# Patient Record
Sex: FEMALE | Race: WHITE | NOT HISPANIC OR LATINO | Employment: FULL TIME | URBAN - NONMETROPOLITAN AREA
[De-identification: names, ages, dates, MRNs, and addresses within clinical notes are randomized per-mention and may not be internally consistent; named-entity substitution may affect disease eponyms.]

---

## 2022-01-13 ENCOUNTER — OFFICE VISIT (OUTPATIENT)
Dept: FAMILY MEDICINE | Facility: CLINIC | Age: 47
End: 2022-01-13

## 2022-01-13 VITALS
OXYGEN SATURATION: 100 % | HEART RATE: 91 BPM | BODY MASS INDEX: 30.53 KG/M2 | TEMPERATURE: 98 F | HEIGHT: 66 IN | WEIGHT: 190 LBS

## 2022-01-13 DIAGNOSIS — R05.9 COUGH: ICD-10-CM

## 2022-01-13 DIAGNOSIS — U07.1 COVID-19: ICD-10-CM

## 2022-01-13 DIAGNOSIS — R09.89 CHEST CONGESTION: Primary | ICD-10-CM

## 2022-01-13 DIAGNOSIS — R51.9 NONINTRACTABLE HEADACHE, UNSPECIFIED CHRONICITY PATTERN, UNSPECIFIED HEADACHE TYPE: ICD-10-CM

## 2022-01-13 LAB
CTP QC/QA: YES
CTP QC/QA: YES
FLUAV AG NPH QL: NEGATIVE
FLUBV AG NPH QL: NEGATIVE
SARS-COV-2 AG RESP QL IA.RAPID: POSITIVE

## 2022-01-13 PROCEDURE — 87804 POCT INFLUENZA A/B: ICD-10-PCS | Mod: QW,,, | Performed by: NURSE PRACTITIONER

## 2022-01-13 PROCEDURE — 99203 PR OFFICE/OUTPT VISIT, NEW, LEVL III, 30-44 MIN: ICD-10-PCS | Mod: ,,, | Performed by: NURSE PRACTITIONER

## 2022-01-13 PROCEDURE — 99203 OFFICE O/P NEW LOW 30 MIN: CPT | Mod: ,,, | Performed by: NURSE PRACTITIONER

## 2022-01-13 PROCEDURE — 87804 INFLUENZA ASSAY W/OPTIC: CPT | Mod: QW,,, | Performed by: NURSE PRACTITIONER

## 2022-01-13 PROCEDURE — 87426 SARS CORONAVIRUS 2 ANTIGEN POCT: ICD-10-PCS | Mod: QW,,, | Performed by: NURSE PRACTITIONER

## 2022-01-13 PROCEDURE — 87426 SARSCOV CORONAVIRUS AG IA: CPT | Mod: QW,,, | Performed by: NURSE PRACTITIONER

## 2022-01-13 RX ORDER — NORGESTREL AND ETHINYL ESTRADIOL 0.3-0.03MG
1 KIT ORAL DAILY
COMMUNITY
Start: 2022-01-09

## 2022-01-13 RX ORDER — AZITHROMYCIN 250 MG/1
TABLET, FILM COATED ORAL
Qty: 6 TABLET | Refills: 0 | Status: SHIPPED | OUTPATIENT
Start: 2022-01-13 | End: 2022-01-18

## 2022-01-13 RX ORDER — BENZONATATE 100 MG/1
100 CAPSULE ORAL 3 TIMES DAILY PRN
Qty: 30 CAPSULE | Refills: 0 | Status: SHIPPED | OUTPATIENT
Start: 2022-01-13 | End: 2022-01-23

## 2022-01-13 NOTE — PROGRESS NOTES
YVETTE Chacon   [facility]      PATIENT NAME: Stephanie Mcarthur  : 1975  DATE: 22  MRN: [id number]      Patient PCP Information     Provider PCP Type    Primary Doctor No General          Reason for Visit / Chief Complaint: Cough, Headache, and Rhinitis         History of Present Illness / Problem Focused Workflow     Stephanie Mcarthur presents to the clinic with Cough, Headache, and Rhinitis     HPI   Patient reports body aches, chills, cough and congestion which began . Chills and body aches have resolved however patient reporting continued cough, congestion. Patient has hx of covid in . Received 1 dose of Moderna vaccine.     Patients LMP approx 9 weeks ago. Typically irregular. On oral birth control.  with prior vasectomy.    Review of Systems     Review of Systems   Constitutional: Negative.    HENT: Positive for congestion, rhinorrhea and sinus pressure.    Eyes: Negative.    Respiratory: Positive for cough.    Cardiovascular: Negative.    Gastrointestinal: Negative.    Endocrine: Negative.    Genitourinary: Negative.    Musculoskeletal: Negative.    Skin: Negative.    Neurological: Positive for headaches.   Hematological: Negative.    Psychiatric/Behavioral: Negative.        Medical / Social / Family History   History reviewed. No pertinent past medical history.    Past Surgical History:   Procedure Laterality Date     SECTION      x4       Social History  Ms.  reports that she has never smoked. She has never used smokeless tobacco. She reports previous drug use. She reports that she does not drink alcohol.    Family History  MsKeely's family history is not on file.    Medications and Allergies     Medications  No outpatient medications have been marked as taking for the 22 encounter (Office Visit) with YVETTE Chacon.       Allergies  Review of patient's allergies indicates:  No Known Allergies    Physical Examination   There were no vitals filed for  this visit.    Physical Exam  Vitals reviewed.   HENT:      Head: Normocephalic.      Right Ear: External ear normal.      Left Ear: External ear normal.      Nose: Nose normal.      Mouth/Throat:      Mouth: Mucous membranes are moist.   Eyes:      Extraocular Movements: Extraocular movements intact.   Cardiovascular:      Rate and Rhythm: Normal rate.      Pulses: Normal pulses.   Pulmonary:      Effort: Pulmonary effort is normal.   Musculoskeletal:         General: Normal range of motion.      Cervical back: Normal range of motion.   Skin:     General: Skin is warm and dry.      Capillary Refill: Capillary refill takes less than 2 seconds.   Neurological:      General: No focal deficit present.      Mental Status: She is oriented to person, place, and time.   Psychiatric:         Mood and Affect: Mood normal.         Behavior: Behavior normal.         Thought Content: Thought content normal.         Judgment: Judgment normal.           No visits with results within 14 Day(s) from this visit.   Latest known visit with results is:   No results found for any previous visit.             Assessment and Plan (including Health Maintenance)      Problem List  Smart Sets  Document Outside HM   :    Plan:   There are no diagnoses linked to this encounter.   There are no Patient Instructions on file for this visit.       Health Maintenance Due   Topic Date Due    Hepatitis C Screening  Never done    Lipid Panel  Never done    COVID-19 Vaccine (1) Never done    HIV Screening  Never done    TETANUS VACCINE  Never done    Mammogram  Never done    Cervical Cancer Screening  Never done    Colorectal Cancer Screening  Never done    Influenza Vaccine (1) Never done         There is no immunization history on file for this patient.     Problem List Items Addressed This Visit    None         The patient has no Health Maintenance topics of status Not Due    No future appointments.         Signature:  Marifer Rios  YVETTE  [facility]     Date of encounter: 1/13/22

## 2022-01-13 NOTE — LETTER
January 13, 2022    Stephanie Mcarthur  [address] [place] [address]             [facility]  Family Medicine  [address] [place] [address]  Phone: [phone]  Fax: [phone]   January 13, 2022     Patient: Stephanie Mcarthur   YOB: 1975   Date of Visit: 1/13/2022       To Whom it May Concern:    Stephanie Mcarthur was seen in my clinic on 1/13/2022. She may return to work on 1/19/22.    Please excuse her from any classes or work missed.    If you have any questions or concerns, please don't hesitate to call.    Sincerely,         YVETTE Chacon

## 2022-01-13 NOTE — PATIENT INSTRUCTIONS
10 THINGS YOU CAN DO TO MANAGE YOUR COVID-19 SYMPTOMS AT HOME  COVID-19    If you have possible or confirmed COVID-19   1. Stay home except to get medical care.   2. Monitor your symptoms carefully. If your symptoms get worse, call your healthcare provider immediately.   3. Get rest and stay hydrated.   4. If you have a medical appointment, call the healthcare provider ahead of time and tell them that you have or may have COVID-19.   5. For medical emergencies, call 911 and notify the dispatch personnel that you have or may have COVID-19.   6. Cover your cough and sneezes with a tissue or use the inside of your elbow.   7. Wash your hands often with soap and water for at least 20 seconds or clean your hands with an alcohol-based hand  that contains at least 60% alcohol.   8. As much as possible, stay in a specific room and away from other people in your home. Also, you should use a separate bathroom, if available. If you need to be around other people in or outside of the home, wear a mask.   9. Avoid sharing personal items with other people in your household, like dishes, towels, and bedding.   10. Clean all surfaces that are touched often, like counters, tabletops, and doorknobs. Use household cleaning sprays or wipes according to the label instructions.   cdc.gov/coronavirus      Pepcid/Famotidine 20 mg twice daily  Vitamin C 1000 mg daily  Zinc 15 mg daily  Melatonin 0.3 mg daily  Adequate hydration  Tylenol prn for fever/body aches   Over the counter mucinx as needed for congestion as directed

## 2022-01-14 NOTE — PROGRESS NOTES
YVETTE Chacon   [facility]      PATIENT NAME: Stephanie Mcarthur  : 1975  DATE: 22  MRN: [id number]      Patient PCP Information     Provider PCP Type    Primary Doctor No General          Reason for Visit / Chief Complaint: Cough, Headache, and Rhinitis         History of Present Illness / Problem Focused Workflow     Stephanie Mcarthur presents to the clinic with Cough, Headache, and Rhinitis     HPI   Patient reports body aches, chills, cough and congestion which began . Chills and body aches have resolved however patient reporting continued cough, congestion. Patient has hx of covid in . Received 1 dose of Moderna vaccine.     Patients LMP approx 9 weeks ago. Typically irregular. On oral birth control.  with prior vasectomy.    Review of Systems     Review of Systems   Constitutional: Negative.    HENT: Positive for congestion, rhinorrhea and sinus pressure.    Eyes: Negative.    Respiratory: Positive for cough.    Cardiovascular: Negative.    Gastrointestinal: Negative.    Endocrine: Negative.    Genitourinary: Negative.    Musculoskeletal: Negative.    Skin: Negative.    Neurological: Positive for headaches.   Hematological: Negative.    Psychiatric/Behavioral: Negative.        Medical / Social / Family History   History reviewed. No pertinent past medical history.    Past Surgical History:   Procedure Laterality Date     SECTION      x4       Social History  Ms.  reports that she has never smoked. She has never used smokeless tobacco. She reports previous drug use. She reports that she does not drink alcohol.    Family History  MsKeely's family history is not on file.    Medications and Allergies     Medications  No outpatient medications have been marked as taking for the 22 encounter (Office Visit) with YVETTE Chacon.       Allergies  Review of patient's allergies indicates:  No Known Allergies    Physical Examination     Vitals:    22 0959  "  Pulse: 91   Temp: 98.4 °F (36.9 °C)   SpO2: 100%   Weight: 86.2 kg (190 lb)   Height: 5' 6" (1.676 m)       Physical Exam  Vitals reviewed.   HENT:      Head: Normocephalic.      Right Ear: External ear normal.      Left Ear: External ear normal.      Nose: Nose normal.      Mouth/Throat:      Mouth: Mucous membranes are moist.   Eyes:      Extraocular Movements: Extraocular movements intact.   Cardiovascular:      Rate and Rhythm: Normal rate.      Pulses: Normal pulses.   Pulmonary:      Effort: Pulmonary effort is normal.   Musculoskeletal:         General: Normal range of motion.      Cervical back: Normal range of motion.   Skin:     General: Skin is warm and dry.      Capillary Refill: Capillary refill takes less than 2 seconds.   Neurological:      General: No focal deficit present.      Mental Status: She is oriented to person, place, and time.   Psychiatric:         Mood and Affect: Mood normal.         Behavior: Behavior normal.         Thought Content: Thought content normal.         Judgment: Judgment normal.           No visits with results within 14 Day(s) from this visit.   Latest known visit with results is:   No results found for any previous visit.             Assessment and Plan (including Health Maintenance)         Plan:   Covid-19  Chest congestion  -     SARS Coronavirus 2 Antigen, POCT positive  -     POCT Influenza A/B negative     Cough    Nonintractable headache, unspecified chronicity pattern, unspecified headache type    Other orders  -     benzonatate (TESSALON) 100 MG capsule; Take 1 capsule (100 mg total) by mouth 3 (three) times daily as needed for Cough.  Dispense: 30 capsule; Refill: 0  -     azithromycin (Z-JERRY) 250 MG tablet; Take 2 tablets by mouth on day 1; Take 1 tablet by mouth on days 2-5  Dispense: 6 tablet; Refill: 0            10 THINGS YOU CAN DO TO MANAGE YOUR COVID-19 SYMPTOMS AT HOME  COVID-19    If you have possible or confirmed COVID-19   1. Stay home except to " get medical care.   2. Monitor your symptoms carefully. If your symptoms get worse, call your healthcare provider immediately.   3. Get rest and stay hydrated.   4. If you have a medical appointment, call the healthcare provider ahead of time and tell them that you have or may have COVID-19.   5. For medical emergencies, call 911 and notify the dispatch personnel that you have or may have COVID-19.   6. Cover your cough and sneezes with a tissue or use the inside of your elbow.   7. Wash your hands often with soap and water for at least 20 seconds or clean your hands with an alcohol-based hand  that contains at least 60% alcohol.   8. As much as possible, stay in a specific room and away from other people in your home. Also, you should use a separate bathroom, if available. If you need to be around other people in or outside of the home, wear a mask.   9. Avoid sharing personal items with other people in your household, like dishes, towels, and bedding.   10. Clean all surfaces that are touched often, like counters, tabletops, and doorknobs. Use household cleaning sprays or wipes according to the label instructions.   cdc.gov/coronavirus  Pepcid/Famotidine 20 mg twice daily  Vitamin C 1000 mg daily  Zinc 15 mg daily  Melatonin 0.3 mg daily  Adequate hydration  Tylenol prn for fever/body aches   Over the counter mucinx as needed for congestion as directed

## 2022-03-08 ENCOUNTER — OFFICE VISIT (OUTPATIENT)
Dept: FAMILY MEDICINE | Facility: CLINIC | Age: 47
End: 2022-03-08

## 2022-03-08 VITALS
DIASTOLIC BLOOD PRESSURE: 82 MMHG | HEART RATE: 86 BPM | RESPIRATION RATE: 18 BRPM | OXYGEN SATURATION: 100 % | BODY MASS INDEX: 31.06 KG/M2 | HEIGHT: 66 IN | TEMPERATURE: 98 F | WEIGHT: 193.25 LBS | SYSTOLIC BLOOD PRESSURE: 131 MMHG

## 2022-03-08 DIAGNOSIS — B00.9 HERPES SIMPLEX TYPE 1 INFECTION: Primary | ICD-10-CM

## 2022-03-08 PROCEDURE — 99213 PR OFFICE/OUTPT VISIT, EST, LEVL III, 20-29 MIN: ICD-10-PCS | Mod: ,,, | Performed by: NURSE PRACTITIONER

## 2022-03-08 PROCEDURE — 99213 OFFICE O/P EST LOW 20 MIN: CPT | Mod: ,,, | Performed by: NURSE PRACTITIONER

## 2022-03-08 RX ORDER — ACYCLOVIR 400 MG/1
400 TABLET ORAL 3 TIMES DAILY
Qty: 30 TABLET | Refills: 0 | Status: SHIPPED | OUTPATIENT
Start: 2022-03-08 | End: 2022-03-08 | Stop reason: CLARIF

## 2022-03-08 RX ORDER — ACYCLOVIR 400 MG/1
400 TABLET ORAL 3 TIMES DAILY
Qty: 30 TABLET | Refills: 0 | Status: SHIPPED | OUTPATIENT
Start: 2022-03-08 | End: 2022-03-18

## 2022-03-08 NOTE — PROGRESS NOTES
Marifer Rios, YVETTE   [facility]      PATIENT NAME: Stephanie Mcarthur  : 1975  DATE: 3/8/22  MRN: [id number]      Patient PCP Information     Provider PCP Type    Primary Doctor No General          Reason for Visit / Chief Complaint: Sinus Problem (Room 5)         History of Present Illness / Problem Focused Workflow     Stephanie Mcarthur presents to the clinic with Sinus Problem (Room 5)     HPI   Left facial pain/abnormal sensation to left side of face. Patient with breakout to left lower lip. Left side of face painful to touch. Started Hung 3/6/2022.  States altered sensation when lying on left side of face.   Runs to top of forehead and left cheek.   No fever. Hx of bells palsy 19 years ago. No residual facial weakness. Does have hx of chicken pox.   No facial breakout, mouth sore only. OP benign.         Review of Systems     Review of Systems   Constitutional: Negative for activity change and unexpected weight change.   HENT: Positive for mouth sores and rhinorrhea. Negative for hearing loss and trouble swallowing.    Eyes: Negative for discharge and visual disturbance.   Respiratory: Negative for chest tightness and wheezing.    Cardiovascular: Negative for chest pain and palpitations.   Gastrointestinal: Negative for blood in stool, constipation, diarrhea and vomiting.   Endocrine: Negative for polydipsia and polyuria.   Genitourinary: Negative for difficulty urinating, dysuria, hematuria and menstrual problem.   Musculoskeletal: Negative for arthralgias, joint swelling and neck pain.   Neurological: Positive for headaches. Negative for weakness.   Psychiatric/Behavioral: Negative for confusion and dysphoric mood.       Medical / Social / Family History     Past Medical History:   Diagnosis Date    Bell's palsy     Hx of bell's palsy. pt states during pregnancy        Past Surgical History:   Procedure Laterality Date     SECTION      x4    EYE SURGERY      KNEE SURGERY      left  "      Social History  Ms.  reports that she has never smoked. She has never used smokeless tobacco. She reports previous drug use. She reports that she does not drink alcohol.    Family History  Ms.'s family history includes Heart disease in her father; Hypertension in her mother.    Medications and Allergies     Medications  Outpatient Medications Marked as Taking for the 3/8/22 encounter (Office Visit) with YVETTE Chacon   Medication Sig Dispense Refill    CRYSELLE, 28, 0.3-30 mg-mcg per tablet Take 1 tablet by mouth once daily.         Allergies  Review of patient's allergies indicates:  No Known Allergies    Physical Examination     Vitals:    03/08/22 1043   BP: 131/82   BP Location: Right arm   Patient Position: Sitting   Pulse: 86   Resp: 18   Temp: 97.9 °F (36.6 °C)   SpO2: 100%   Weight: 87.7 kg (193 lb 4 oz)   Height: 5' 6" (1.676 m)       Physical Exam  Vitals and nursing note reviewed.   Constitutional:       Appearance: Normal appearance. She is normal weight.   HENT:      Head: Normocephalic.      Nose: Nose normal.      Mouth/Throat:      Mouth: Mucous membranes are moist. Oral lesions present.     Eyes:      Extraocular Movements: Extraocular movements intact.      Conjunctiva/sclera: Conjunctivae normal.      Pupils: Pupils are equal, round, and reactive to light.   Cardiovascular:      Rate and Rhythm: Normal rate and regular rhythm.      Pulses: Normal pulses.      Heart sounds: Normal heart sounds. No murmur heard.  Pulmonary:      Effort: Pulmonary effort is normal.      Breath sounds: Normal breath sounds. No stridor. No wheezing or rhonchi.   Abdominal:      General: Bowel sounds are normal. There is no distension.      Palpations: Abdomen is soft. There is no mass.      Tenderness: There is no abdominal tenderness.   Musculoskeletal:         General: No swelling or tenderness. Normal range of motion.      Cervical back: Normal range of motion and neck supple.      Right lower leg: No " edema.      Left lower leg: No edema.   Skin:     General: Skin is warm and dry.      Capillary Refill: Capillary refill takes less than 2 seconds.      Comments: Tender along left facial nerve    Neurological:      General: No focal deficit present.      Mental Status: She is alert and oriented to person, place, and time. Mental status is at baseline.      Cranial Nerves: No cranial nerve deficit.      Sensory: Sensory deficit present.      Motor: No weakness.      Coordination: Coordination normal.      Gait: Gait normal.      Comments: Left facial pain  CN intact  No facial weakness noted    Psychiatric:         Mood and Affect: Mood normal.         Behavior: Behavior normal.         Thought Content: Thought content normal.         Judgment: Judgment normal.           No visits with results within 14 Day(s) from this visit.   Latest known visit with results is:   Office Visit on 01/13/2022   Component Date Value Ref Range Status    SARS Coronavirus 2 Antigen 01/13/2022 Positive (A) Negative Final     Acceptable 01/13/2022 Yes   Final    Rapid Influenza A Ag 01/13/2022 Negative  Negative Final    Rapid Influenza B Ag 01/13/2022 Negative  Negative Final     Acceptable 01/13/2022 Yes   Final             Assessment and Plan (including Health Maintenance)   :    Plan:     Herpes simplex type 1 infection vs early Herpes zoster   -     acyclovir (ZOVIRAX) 400 MG tablet; Take 1 tablet (400 mg total) by mouth 3 (three) times daily. for 10 days  Dispense: 30 tablet; Refill: 0       There are no Patient Instructions on file for this visit.       Health Maintenance Due   Topic Date Due    Hepatitis C Screening  Never done    Cervical Cancer Screening  Never done    Lipid Panel  Never done    COVID-19 Vaccine (1) Never done    HIV Screening  Never done    TETANUS VACCINE  Never done    Mammogram  Never done    Colorectal Cancer Screening  Never done    Influenza Vaccine (1) Never  done         There is no immunization history on file for this patient.     Problem List Items Addressed This Visit    None     Visit Diagnoses     Herpes simplex type 1 infection    -  Primary          The patient has no Health Maintenance topics of status Not Due    No future appointments.         Signature:  YVETTE Chacon  [facility]     Date of encounter: 3/8/22

## 2022-09-09 ENCOUNTER — HOSPITAL ENCOUNTER (EMERGENCY)
Facility: HOSPITAL | Age: 47
Discharge: HOME | End: 2022-09-09
Payer: OTHER MISCELLANEOUS

## 2022-09-09 VITALS
TEMPERATURE: 99 F | WEIGHT: 203 LBS | DIASTOLIC BLOOD PRESSURE: 94 MMHG | RESPIRATION RATE: 16 BRPM | SYSTOLIC BLOOD PRESSURE: 136 MMHG | HEIGHT: 66 IN | BODY MASS INDEX: 32.62 KG/M2 | OXYGEN SATURATION: 100 % | HEART RATE: 87 BPM

## 2022-09-09 DIAGNOSIS — M89.8X1 PAIN OF LEFT CLAVICLE: ICD-10-CM

## 2022-09-09 DIAGNOSIS — T14.8XXA CONTUSION OF LEFT CLAVICLE, INITIAL ENCOUNTER: Primary | ICD-10-CM

## 2022-09-09 PROCEDURE — 99284 EMERGENCY DEPT VISIT MOD MDM: CPT

## 2022-09-09 PROCEDURE — 99284 EMERGENCY DEPT VISIT MOD MDM: CPT | Mod: ,,, | Performed by: NURSE PRACTITIONER

## 2022-09-09 PROCEDURE — 63600175 PHARM REV CODE 636 W HCPCS: Performed by: NURSE PRACTITIONER

## 2022-09-09 PROCEDURE — 96372 THER/PROPH/DIAG INJ SC/IM: CPT

## 2022-09-09 PROCEDURE — 99284 PR EMERGENCY DEPT VISIT,LEVEL IV: ICD-10-PCS | Mod: ,,, | Performed by: NURSE PRACTITIONER

## 2022-09-09 RX ORDER — KETOROLAC TROMETHAMINE 30 MG/ML
30 INJECTION, SOLUTION INTRAMUSCULAR; INTRAVENOUS
Status: COMPLETED | OUTPATIENT
Start: 2022-09-09 | End: 2022-09-09

## 2022-09-09 RX ADMIN — KETOROLAC TROMETHAMINE 30 MG: 30 INJECTION, SOLUTION INTRAMUSCULAR; INTRAVENOUS at 08:09

## 2022-09-10 NOTE — ED PROVIDER NOTES
Encounter Date: 2022       History     Chief Complaint   Patient presents with    Abrasion     48 y/o WF with no known PMH presents with c/o neck pain and left clavicle pain/tenderness. States she was sitting under a pop-up tent when it became heavy from the rain and collapsed and hit her on her left side. She denies loss of consciousness. Denies headache, dizziness, blurred vision. No numbness, tingling, or weakness. Denies nausea or vomiting. She has taken nothing for her symptoms. Pain is worsened by palpation.    The history is provided by the patient.   Review of patient's allergies indicates:  No Known Allergies  Past Medical History:   Diagnosis Date    Bell's palsy     Hx of bell's palsy. pt states during pregnancy      Past Surgical History:   Procedure Laterality Date     SECTION      x4    EYE SURGERY      KNEE SURGERY      left     Family History   Problem Relation Age of Onset    Hypertension Mother     Heart disease Father      Social History     Tobacco Use    Smoking status: Never    Smokeless tobacco: Never   Substance Use Topics    Alcohol use: Never    Drug use: Not Currently     Review of Systems   Constitutional:  Negative for chills, fatigue and fever.   Eyes:  Negative for photophobia and visual disturbance.   Gastrointestinal:  Negative for nausea and vomiting.   Musculoskeletal:  Positive for myalgias and neck pain. Negative for neck stiffness.   Neurological:  Negative for dizziness, syncope, facial asymmetry, weakness, light-headedness, numbness and headaches.   All other systems reviewed and are negative.    Physical Exam     Initial Vitals [22]   BP Pulse Resp Temp SpO2   (!) 136/94 87 16 99 °F (37.2 °C) 100 %      MAP       --         Physical Exam    Constitutional: She appears well-developed and well-nourished. She is cooperative.   HENT:   Head: Normocephalic and atraumatic.   Neck: Neck supple.   Normal range of motion.   Full passive range of motion without  pain.     Cardiovascular:  Normal rate, regular rhythm, normal heart sounds and normal pulses.           Pulmonary/Chest: Effort normal and breath sounds normal.   Abdominal: Abdomen is soft. Bowel sounds are normal. There is no abdominal tenderness.   Musculoskeletal:        Arms:       Cervical back: Full passive range of motion without pain, normal range of motion and neck supple. No edema. Muscular tenderness present. No spinous process tenderness. Normal range of motion.     Neurological: She is alert and oriented to person, place, and time. GCS eye subscore is 4. GCS verbal subscore is 5. GCS motor subscore is 6.   afocal   Skin: Skin is warm, dry and intact. Capillary refill takes less than 2 seconds.   Psychiatric: She has a normal mood and affect. Her speech is normal and behavior is normal. Judgment and thought content normal. Cognition and memory are normal.       Medical Screening Exam   See Full Note    ED Course   Procedures  Labs Reviewed - No data to display       Imaging Results              X-Ray Clavicle Left (Final result)  Result time 09/09/22 20:29:13      Final result by Dilip Butcher MD (09/09/22 20:29:13)                   Impression:      There are degenerative changes seen of the left acromioclavicular joint. No fracture detected of the left clavicle.      Electronically signed by: Dilip Butcher  Date:    09/09/2022  Time:    20:29               Narrative:    EXAMINATION:  XR CLAVICLE LEFT    CLINICAL HISTORY:  Other specified disorders of bone, shoulder    TECHNIQUE:  Two views of the left clavicle obtained    COMPARISON:  None    FINDINGS:  There are degenerative changes seen of the left acromioclavicular joint. No fracture detected of the left clavicle.                                       X-Ray Cervical Spine AP And Lateral (Final result)  Result time 09/09/22 20:28:20      Final result by Dilip Butcher MD (09/09/22 20:28:20)                   Impression:      As above      Electronically  signed by: Dilip Butcher  Date:    09/09/2022  Time:    20:28               Narrative:    EXAMINATION:  XR CERVICAL SPINE AP LATERAL    CLINICAL HISTORY:  pain; tent fell and hit patient on the left side and back of neck;    TECHNIQUE:  AP, lateral and open mouth views of the cervical spine were performed.    COMPARISON:  None.    FINDINGS:  No displaced fracture detected.  There are degenerative endplate and facet changes mildly throughout the cervical spine.  The vertebral body heights and alignment are maintained.                                       Medications   ketorolac injection 30 mg (30 mg Intramuscular Given 9/9/22 2046)                Counseled on supportive measures. Warning s/s discussed and return precautions given; the patient has v/u.         Clinical Impression:   Final diagnoses:  [M89.8X1] Pain of left clavicle  [T14.8XXA] Contusion of left clavicle, initial encounter (Primary)        ED Disposition Condition    Discharge Stable          ED Prescriptions    None       Follow-up Information       Follow up With Specialties Details Why Contact Info    Primary Care Provider   As needed              YVETTE Tovar  09/09/22 [id number]

## 2022-09-10 NOTE — ED TRIAGE NOTES
Pt had a pop up tent fall on her at a ball game tonight, pt has abrasion to right side of neck and left sholder

## 2022-10-13 ENCOUNTER — OFFICE VISIT (OUTPATIENT)
Dept: FAMILY MEDICINE | Facility: CLINIC | Age: 47
End: 2022-10-13

## 2022-10-13 VITALS
BODY MASS INDEX: 31.42 KG/M2 | OXYGEN SATURATION: 98 % | WEIGHT: 195.5 LBS | DIASTOLIC BLOOD PRESSURE: 86 MMHG | SYSTOLIC BLOOD PRESSURE: 125 MMHG | RESPIRATION RATE: 19 BRPM | HEIGHT: 66 IN | TEMPERATURE: 98 F | HEART RATE: 107 BPM

## 2022-10-13 DIAGNOSIS — J02.9 SORE THROAT: ICD-10-CM

## 2022-10-13 DIAGNOSIS — J40 BRONCHITIS: ICD-10-CM

## 2022-10-13 DIAGNOSIS — R09.81 NASAL CONGESTION: ICD-10-CM

## 2022-10-13 DIAGNOSIS — R51.9 NONINTRACTABLE HEADACHE, UNSPECIFIED CHRONICITY PATTERN, UNSPECIFIED HEADACHE TYPE: ICD-10-CM

## 2022-10-13 DIAGNOSIS — R05.9 COUGH, UNSPECIFIED TYPE: Primary | ICD-10-CM

## 2022-10-13 LAB
CTP QC/QA: YES
CTP QC/QA: YES
FLUAV AG NPH QL: NEGATIVE
FLUBV AG NPH QL: NEGATIVE
S PYO RRNA THROAT QL PROBE: NEGATIVE
SARS-COV-2 AG RESP QL IA.RAPID: NEGATIVE

## 2022-10-13 PROCEDURE — 99214 OFFICE O/P EST MOD 30 MIN: CPT | Mod: 25,,, | Performed by: NURSE PRACTITIONER

## 2022-10-13 PROCEDURE — 87880 POCT RAPID STREP A: ICD-10-PCS | Mod: QW,,, | Performed by: NURSE PRACTITIONER

## 2022-10-13 PROCEDURE — 99214 PR OFFICE/OUTPT VISIT, EST, LEVL IV, 30-39 MIN: ICD-10-PCS | Mod: 25,,, | Performed by: NURSE PRACTITIONER

## 2022-10-13 PROCEDURE — 87880 STREP A ASSAY W/OPTIC: CPT | Mod: QW,,, | Performed by: NURSE PRACTITIONER

## 2022-10-13 PROCEDURE — 87428 SARSCOV & INF VIR A&B AG IA: CPT | Mod: QW,,, | Performed by: NURSE PRACTITIONER

## 2022-10-13 PROCEDURE — 87081 CULTURE SCREEN ONLY: CPT | Mod: ,,, | Performed by: CLINICAL MEDICAL LABORATORY

## 2022-10-13 PROCEDURE — 96372 THER/PROPH/DIAG INJ SC/IM: CPT | Mod: ,,, | Performed by: NURSE PRACTITIONER

## 2022-10-13 PROCEDURE — 87428 POCT SARS-COV2 (COVID) WITH FLU ANTIGEN: ICD-10-PCS | Mod: QW,,, | Performed by: NURSE PRACTITIONER

## 2022-10-13 PROCEDURE — 87081 CULTURE, STREP A,  THROAT: ICD-10-PCS | Mod: ,,, | Performed by: CLINICAL MEDICAL LABORATORY

## 2022-10-13 PROCEDURE — 96372 PR INJECTION,THERAP/PROPH/DIAG2ST, IM OR SUBCUT: ICD-10-PCS | Mod: ,,, | Performed by: NURSE PRACTITIONER

## 2022-10-13 RX ORDER — DEXAMETHASONE SODIUM PHOSPHATE 4 MG/ML
4 INJECTION, SOLUTION INTRA-ARTICULAR; INTRALESIONAL; INTRAMUSCULAR; INTRAVENOUS; SOFT TISSUE
Status: COMPLETED | OUTPATIENT
Start: 2022-10-13 | End: 2022-10-13

## 2022-10-13 RX ORDER — BENZONATATE 100 MG/1
200 CAPSULE ORAL 3 TIMES DAILY PRN
Qty: 30 CAPSULE | Refills: 1 | Status: SHIPPED | OUTPATIENT
Start: 2022-10-13 | End: 2022-10-23

## 2022-10-13 RX ORDER — CETIRIZINE HYDROCHLORIDE 10 MG/1
10 TABLET ORAL DAILY
Qty: 30 TABLET | Refills: 3 | Status: SHIPPED | OUTPATIENT
Start: 2022-10-13 | End: 2023-02-10

## 2022-10-13 RX ORDER — FLUTICASONE PROPIONATE 50 MCG
1 SPRAY, SUSPENSION (ML) NASAL DAILY
Qty: 18.2 ML | Refills: 1 | Status: SHIPPED | OUTPATIENT
Start: 2022-10-13

## 2022-10-13 RX ORDER — NORGESTREL AND ETHINYL ESTRADIOL 0.3-0.03MG
KIT ORAL
COMMUNITY
Start: 2022-06-29

## 2022-10-13 RX ADMIN — DEXAMETHASONE SODIUM PHOSPHATE 4 MG: 4 INJECTION, SOLUTION INTRA-ARTICULAR; INTRALESIONAL; INTRAMUSCULAR; INTRAVENOUS; SOFT TISSUE at 05:10

## 2022-10-13 NOTE — PROGRESS NOTES
YVETTE Chacon   [facility]      PATIENT NAME: Stephanie Mcarthur  : 1975  DATE: 10/13/22  MRN: [id number]      Patient PCP Information       Provider PCP Type    Primary Doctor No General            Reason for Visit / Chief Complaint: Cough, Sore Throat, Nasal Congestion, and Headache         History of Present Illness / Problem Focused Workflow     Stephanie Mcarthur presents to the clinic with Cough, Sore Throat, Nasal Congestion, and Headache     HPI  Ms Mcarthur presents to clinic with cough, sore throat, nasal congestion, headache and barking cough.   No reported fever. No nv. Patient symptoms have lasted several days.     Review of Systems     Review of Systems   Constitutional:  Negative for activity change, appetite change, chills, diaphoresis, fatigue, fever and unexpected weight change.   HENT:  Positive for congestion and sore throat. Negative for ear pain, facial swelling, hearing loss and nosebleeds.    Eyes: Negative.    Respiratory:  Positive for cough. Negative for apnea, shortness of breath and wheezing.    Cardiovascular:  Negative for chest pain, palpitations and leg swelling.   Gastrointestinal:  Negative for abdominal distention, abdominal pain, blood in stool, constipation, diarrhea and nausea.   Endocrine: Negative for cold intolerance, heat intolerance, polydipsia, polyphagia and polyuria.   Genitourinary:  Negative for decreased urine volume, difficulty urinating, dysuria, flank pain, frequency, hematuria and urgency.   Musculoskeletal:  Negative for arthralgias, joint swelling and myalgias.   Skin:  Negative for color change and rash.   Neurological:  Positive for headaches. Negative for dizziness, tremors, seizures, syncope, facial asymmetry, speech difficulty, weakness, light-headedness and numbness.   Hematological:  Negative for adenopathy. Does not bruise/bleed easily.   Psychiatric/Behavioral:  Negative for behavioral problems and confusion.      Medical / Social / Family  "History     Past Medical History:   Diagnosis Date    Bell's palsy     Hx of bell's palsy. pt states during pregnancy        Past Surgical History:   Procedure Laterality Date     SECTION      x4    EYE SURGERY      KNEE SURGERY      left       Social History  Ms.  reports that she has never smoked. She has never used smokeless tobacco. She reports that she does not currently use drugs. She reports that she does not drink alcohol.    Family History  Ms.'s family history includes Heart disease in her father; Hypertension in her mother.    Medications and Allergies     Medications  Outpatient Medications Marked as Taking for the 10/13/22 encounter (Office Visit) with YVETTE Chacon   Medication Sig Dispense Refill    CRYSELLE, 28, 0.3-30 mg-mcg per tablet Take 1 tablet by mouth once daily.      norgestrel-ethinyl estradioL (CRYSELLE, 28,) 0.3-30 mg-mcg per tablet Take by mouth.         Allergies  Review of patient's allergies indicates:  No Known Allergies    Physical Examination     Vitals:    10/13/22 1609   BP: 125/86   BP Location: Left arm   Patient Position: Sitting   Pulse: 107   Resp: 19   Temp: 98.1 °F (36.7 °C)   SpO2: 98%   Weight: 88.7 kg (195 lb 8 oz)   Height: 5' 6" (1.676 m)       Physical Exam  Vitals and nursing note reviewed.   Constitutional:       Appearance: Normal appearance. She is obese.   HENT:      Head: Normocephalic.      Right Ear: Tympanic membrane, ear canal and external ear normal.      Left Ear: Tympanic membrane, ear canal and external ear normal.      Nose: Congestion present.      Mouth/Throat:      Mouth: Mucous membranes are moist.   Eyes:      Extraocular Movements: Extraocular movements intact.      Conjunctiva/sclera: Conjunctivae normal.      Pupils: Pupils are equal, round, and reactive to light.   Cardiovascular:      Rate and Rhythm: Normal rate and regular rhythm.      Pulses: Normal pulses.      Heart sounds: Normal heart sounds. No murmur " heard.  Pulmonary:      Effort: Pulmonary effort is normal.      Breath sounds: Normal breath sounds. No stridor. No wheezing or rhonchi.      Comments: Barking cough  Abdominal:      General: Bowel sounds are normal. There is no distension.      Palpations: Abdomen is soft. There is no mass.      Tenderness: There is no abdominal tenderness.   Musculoskeletal:         General: No swelling or tenderness. Normal range of motion.      Cervical back: Normal range of motion and neck supple.      Right lower leg: No edema.      Left lower leg: No edema.   Skin:     General: Skin is warm and dry.      Capillary Refill: Capillary refill takes less than 2 seconds.   Neurological:      General: No focal deficit present.      Mental Status: She is alert and oriented to person, place, and time. Mental status is at baseline.      Cranial Nerves: No cranial nerve deficit.      Sensory: No sensory deficit.      Motor: No weakness.   Psychiatric:         Mood and Affect: Mood normal.         Behavior: Behavior normal.         Thought Content: Thought content normal.         Judgment: Judgment normal.         Office Visit on 10/13/2022   Component Date Value Ref Range Status    SARS Coronavirus 2 Antigen 10/13/2022 Negative  Negative Final    Rapid Influenza A Ag 10/13/2022 Negative  Negative Final    Rapid Influenza B Ag 10/13/2022 Negative  Negative Final     Acceptable 10/13/2022 Yes   Final    Rapid Strep A Screen 10/13/2022 Negative  Negative Final     Acceptable 10/13/2022 Yes   Final    Culture, Group A Strep 10/13/2022 Negative for Group A Streptococcus   Final             Assessment and Plan (including Health Maintenance)       Cough, unspecified type  -     POCT SARS-COV2 (COVID) with Flu Antigen    Sore throat  -     POCT SARS-COV2 (COVID) with Flu Antigen  -     POCT rapid strep A  -     Strep A culture, throat; Future; Expected date: 10/13/2022    Nasal congestion  -     POCT SARS-COV2  (COVID) with Flu Antigen  -     cetirizine (ZYRTEC) 10 MG tablet; Take 1 tablet (10 mg total) by mouth once daily.  Dispense: 30 tablet; Refill: 3  -     fluticasone propionate (FLONASE) 50 mcg/actuation nasal spray; 1 spray (50 mcg total) by Each Nostril route once daily.  Dispense: 18.2 mL; Refill: 1    Nonintractable headache, unspecified chronicity pattern, unspecified headache type  -     POCT SARS-COV2 (COVID) with Flu Antigen    Bronchitis  -     dexAMETHasone injection 4 mg  -     benzonatate (TESSALON) 100 MG capsule; Take 2 capsules (200 mg total) by mouth 3 (three) times daily as needed for Cough.  Dispense: 30 capsule; Refill: 1     There are no Patient Instructions on file for this visit.       Health Maintenance Due   Topic Date Due    Hepatitis C Screening  Never done    Cervical Cancer Screening  Never done    Lipid Panel  Never done    COVID-19 Vaccine (1) Never done    Pneumococcal Vaccines (Age 0-64) (1 - PCV) Never done    HIV Screening  Never done    TETANUS VACCINE  Never done    Mammogram  Never done    Colorectal Cancer Screening  Never done    Influenza Vaccine (1) Never done         There is no immunization history on file for this patient.     Problem List Items Addressed This Visit    None  Visit Diagnoses       Cough, unspecified type    -  Primary    Relevant Orders    POCT SARS-COV2 (COVID) with Flu Antigen (Completed)    Sore throat        Relevant Orders    POCT SARS-COV2 (COVID) with Flu Antigen (Completed)    POCT rapid strep A (Completed)    Strep A culture, throat (Completed)    Nasal congestion        Relevant Medications    cetirizine (ZYRTEC) 10 MG tablet    fluticasone propionate (FLONASE) 50 mcg/actuation nasal spray    Other Relevant Orders    POCT SARS-COV2 (COVID) with Flu Antigen (Completed)    Nonintractable headache, unspecified chronicity pattern, unspecified headache type        Relevant Orders    POCT SARS-COV2 (COVID) with Flu Antigen (Completed)    Bronchitis         Relevant Medications    dexAMETHasone injection 4 mg (Completed)    benzonatate (TESSALON) 100 MG capsule            The patient has no Health Maintenance topics of status Not Due    No future appointments.           Signature:  YVETTE Chacon  [facility]     Date of encounter: 10/13/22

## 2022-10-15 LAB — DEPRECATED S PYO AG THROAT QL EIA: NORMAL

## 2024-04-03 ENCOUNTER — HOSPITAL ENCOUNTER (OUTPATIENT)
Dept: RADIOLOGY | Facility: HOSPITAL | Age: 49
Discharge: HOME | End: 2024-04-03
Attending: NURSE PRACTITIONER
Payer: COMMERCIAL

## 2024-04-03 ENCOUNTER — OFFICE VISIT (OUTPATIENT)
Dept: FAMILY MEDICINE | Facility: CLINIC | Age: 49
End: 2024-04-03
Payer: COMMERCIAL

## 2024-04-03 VITALS
RESPIRATION RATE: 16 BRPM | SYSTOLIC BLOOD PRESSURE: 126 MMHG | WEIGHT: 213.13 LBS | OXYGEN SATURATION: 99 % | BODY MASS INDEX: 34.25 KG/M2 | HEIGHT: 66 IN | TEMPERATURE: 98 F | HEART RATE: 75 BPM | DIASTOLIC BLOOD PRESSURE: 70 MMHG

## 2024-04-03 DIAGNOSIS — R10.84 GENERALIZED ABDOMINAL PAIN: Primary | ICD-10-CM

## 2024-04-03 DIAGNOSIS — J30.1 SEASONAL ALLERGIC RHINITIS DUE TO POLLEN: ICD-10-CM

## 2024-04-03 DIAGNOSIS — J06.9 ACUTE UPPER RESPIRATORY INFECTION: ICD-10-CM

## 2024-04-03 DIAGNOSIS — R10.84 GENERALIZED ABDOMINAL PAIN: ICD-10-CM

## 2024-04-03 DIAGNOSIS — R19.7 DIARRHEA, UNSPECIFIED TYPE: ICD-10-CM

## 2024-04-03 DIAGNOSIS — R79.89 ABNORMAL CBC: ICD-10-CM

## 2024-04-03 DIAGNOSIS — Z00.00 REGULAR CHECK-UP: ICD-10-CM

## 2024-04-03 DIAGNOSIS — K42.9 UMBILICAL HERNIA WITHOUT OBSTRUCTION AND WITHOUT GANGRENE: ICD-10-CM

## 2024-04-03 DIAGNOSIS — R11.0 NAUSEA: ICD-10-CM

## 2024-04-03 LAB
ALBUMIN SERPL BCP-MCNC: 3.5 G/DL (ref 3.5–5)
ALBUMIN/GLOB SERPL: 0.8 {RATIO}
ALP SERPL-CCNC: 129 U/L (ref 39–100)
ALT SERPL W P-5'-P-CCNC: 23 U/L (ref 13–56)
ANION GAP SERPL CALCULATED.3IONS-SCNC: 10 MMOL/L (ref 7–16)
ANISOCYTOSIS BLD QL SMEAR: ABNORMAL
AST SERPL W P-5'-P-CCNC: 19 U/L (ref 15–37)
BASOPHILS # BLD AUTO: 0.07 K/UL (ref 0–0.2)
BASOPHILS NFR BLD AUTO: 0.8 % (ref 0–1)
BILIRUB SERPL-MCNC: 0.3 MG/DL (ref ?–1.2)
BUN SERPL-MCNC: 9 MG/DL (ref 7–18)
BUN/CREAT SERPL: 12 (ref 6–20)
CALCIUM SERPL-MCNC: 9.1 MG/DL (ref 8.5–10.1)
CHLORIDE SERPL-SCNC: 108 MMOL/L (ref 98–107)
CHOLEST SERPL-MCNC: 179 MG/DL (ref 0–200)
CHOLEST/HDLC SERPL: 3.8 {RATIO}
CO2 SERPL-SCNC: 26 MMOL/L (ref 21–32)
CREAT SERPL-MCNC: 0.73 MG/DL (ref 0.55–1.02)
DIFFERENTIAL METHOD BLD: ABNORMAL
EGFR (NO RACE VARIABLE) (RUSH/TITUS): 102 ML/MIN/1.73M2
EOSINOPHIL # BLD AUTO: 0.28 K/UL (ref 0–0.5)
EOSINOPHIL NFR BLD AUTO: 3.3 % (ref 1–4)
ERYTHROCYTE [DISTWIDTH] IN BLOOD BY AUTOMATED COUNT: 19.5 % (ref 11.5–14.5)
EST. AVERAGE GLUCOSE BLD GHB EST-MCNC: 120 MG/DL
GLOBULIN SER-MCNC: 4.5 G/DL (ref 2–4)
GLUCOSE SERPL-MCNC: 109 MG/DL (ref 74–106)
HBA1C MFR BLD HPLC: 5.8 % (ref 4.5–6.6)
HCT VFR BLD AUTO: 39 % (ref 38–47)
HDLC SERPL-MCNC: 47 MG/DL (ref 40–60)
HGB BLD-MCNC: 11.1 G/DL (ref 12–16)
HYPOCHROMIA BLD QL SMEAR: ABNORMAL
IMM GRANULOCYTES # BLD AUTO: 0.04 K/UL (ref 0–0.04)
IMM GRANULOCYTES NFR BLD: 0.5 % (ref 0–0.4)
LDLC SERPL CALC-MCNC: 108 MG/DL
LDLC/HDLC SERPL: 2.3 {RATIO}
LYMPHOCYTES # BLD AUTO: 2.67 K/UL (ref 1–4.8)
LYMPHOCYTES NFR BLD AUTO: 31.9 % (ref 27–41)
MCH RBC QN AUTO: 20.1 PG (ref 27–31)
MCHC RBC AUTO-ENTMCNC: 28.5 G/DL (ref 32–36)
MCV RBC AUTO: 70.7 FL (ref 80–96)
MICROCYTES BLD QL SMEAR: ABNORMAL
MONOCYTES # BLD AUTO: 0.62 K/UL (ref 0–0.8)
MONOCYTES NFR BLD AUTO: 7.4 % (ref 2–6)
MPC BLD CALC-MCNC: 9.6 FL (ref 9.4–12.4)
NEUTROPHILS # BLD AUTO: 4.7 K/UL (ref 1.8–7.7)
NEUTROPHILS NFR BLD AUTO: 56.1 % (ref 53–65)
NONHDLC SERPL-MCNC: 132 MG/DL
NRBC # BLD AUTO: 0 X10E3/UL
NRBC, AUTO (.00): 0 %
OVALOCYTES BLD QL SMEAR: ABNORMAL
PLATELET # BLD AUTO: 464 K/UL (ref 150–400)
PLATELET MORPHOLOGY: ABNORMAL
POTASSIUM SERPL-SCNC: 4.1 MMOL/L (ref 3.5–5.1)
PROT SERPL-MCNC: 8 G/DL (ref 6.4–8.2)
RBC # BLD AUTO: 5.52 M/UL (ref 4.2–5.4)
SODIUM SERPL-SCNC: 140 MMOL/L (ref 136–145)
TRIGL SERPL-MCNC: 118 MG/DL (ref 35–150)
TSH SERPL DL<=0.005 MIU/L-ACNC: 0.85 UIU/ML (ref 0.36–3.74)
VLDLC SERPL-MCNC: 24 MG/DL
WBC # BLD AUTO: 8.38 K/UL (ref 4.5–11)

## 2024-04-03 PROCEDURE — 3074F SYST BP LT 130 MM HG: CPT | Mod: CPTII,,, | Performed by: NURSE PRACTITIONER

## 2024-04-03 PROCEDURE — 99214 OFFICE O/P EST MOD 30 MIN: CPT | Mod: ,,, | Performed by: NURSE PRACTITIONER

## 2024-04-03 PROCEDURE — 3008F BODY MASS INDEX DOCD: CPT | Mod: CPTII,,, | Performed by: NURSE PRACTITIONER

## 2024-04-03 PROCEDURE — 80061 LIPID PANEL: CPT | Mod: ,,, | Performed by: CLINICAL MEDICAL LABORATORY

## 2024-04-03 PROCEDURE — 74018 RADEX ABDOMEN 1 VIEW: CPT | Mod: TC

## 2024-04-03 PROCEDURE — 3044F HG A1C LEVEL LT 7.0%: CPT | Mod: CPTII,,, | Performed by: NURSE PRACTITIONER

## 2024-04-03 PROCEDURE — 80050 GENERAL HEALTH PANEL: CPT | Mod: ,,, | Performed by: CLINICAL MEDICAL LABORATORY

## 2024-04-03 PROCEDURE — 83036 HEMOGLOBIN GLYCOSYLATED A1C: CPT | Mod: ,,, | Performed by: CLINICAL MEDICAL LABORATORY

## 2024-04-03 PROCEDURE — 3078F DIAST BP <80 MM HG: CPT | Mod: CPTII,,, | Performed by: NURSE PRACTITIONER

## 2024-04-03 RX ORDER — CETIRIZINE HYDROCHLORIDE, PSEUDOEPHEDRINE HYDROCHLORIDE 5; 120 MG/1; MG/1
1 TABLET, FILM COATED, EXTENDED RELEASE ORAL 2 TIMES DAILY
Qty: 24 TABLET | Refills: 0 | Status: SHIPPED | OUTPATIENT
Start: 2024-04-03 | End: 2024-04-05

## 2024-04-03 RX ORDER — OMEPRAZOLE 40 MG/1
40 CAPSULE, DELAYED RELEASE ORAL DAILY
Qty: 30 CAPSULE | Refills: 2 | Status: SHIPPED | OUTPATIENT
Start: 2024-04-03

## 2024-04-03 RX ORDER — AMOXICILLIN AND CLAVULANATE POTASSIUM 875; 125 MG/1; MG/1
1 TABLET, FILM COATED ORAL 2 TIMES DAILY
Qty: 20 TABLET | Refills: 0 | Status: SHIPPED | OUTPATIENT
Start: 2024-04-03 | End: 2024-04-13

## 2024-04-03 RX ORDER — PROMETHAZINE HYDROCHLORIDE AND DEXTROMETHORPHAN HYDROBROMIDE 6.25; 15 MG/5ML; MG/5ML
5 SYRUP ORAL NIGHTLY PRN
Qty: 120 ML | Refills: 0 | Status: SHIPPED | OUTPATIENT
Start: 2024-04-03 | End: 2024-06-18

## 2024-04-08 ENCOUNTER — HOSPITAL ENCOUNTER (OUTPATIENT)
Dept: RADIOLOGY | Facility: HOSPITAL | Age: 49
Discharge: HOME | End: 2024-04-08
Payer: COMMERCIAL

## 2024-04-08 ENCOUNTER — OFFICE VISIT (OUTPATIENT)
Dept: ORTHOPEDICS | Facility: CLINIC | Age: 49
End: 2024-04-08
Payer: COMMERCIAL

## 2024-04-08 VITALS — WEIGHT: 213 LBS | BODY MASS INDEX: 34.23 KG/M2 | HEIGHT: 66 IN

## 2024-04-08 DIAGNOSIS — G89.29 CHRONIC PAIN OF RIGHT KNEE: Primary | ICD-10-CM

## 2024-04-08 DIAGNOSIS — M25.561 ACUTE PAIN OF RIGHT KNEE: ICD-10-CM

## 2024-04-08 DIAGNOSIS — M25.561 CHRONIC PAIN OF RIGHT KNEE: Primary | ICD-10-CM

## 2024-04-08 DIAGNOSIS — M25.561 ACUTE PAIN OF RIGHT KNEE: Primary | ICD-10-CM

## 2024-04-08 PROCEDURE — 99203 OFFICE O/P NEW LOW 30 MIN: CPT | Mod: S$PBB,,,

## 2024-04-08 PROCEDURE — 3008F BODY MASS INDEX DOCD: CPT | Mod: CPTII,,,

## 2024-04-08 PROCEDURE — 99213 OFFICE O/P EST LOW 20 MIN: CPT | Mod: PBBFAC,25

## 2024-04-08 PROCEDURE — 3044F HG A1C LEVEL LT 7.0%: CPT | Mod: CPTII,,,

## 2024-04-08 PROCEDURE — 73562 X-RAY EXAM OF KNEE 3: CPT | Mod: TC,RT

## 2024-04-08 PROCEDURE — 1159F MED LIST DOCD IN RCRD: CPT | Mod: CPTII,,,

## 2024-04-08 PROCEDURE — 73562 X-RAY EXAM OF KNEE 3: CPT | Mod: 26,RT,, | Performed by: STUDENT IN AN ORGANIZED HEALTH CARE EDUCATION/TRAINING PROGRAM

## 2024-04-08 RX ORDER — NAPROXEN 500 MG/1
500 TABLET ORAL 2 TIMES DAILY WITH MEALS
Qty: 28 TABLET | Refills: 0 | Status: SHIPPED | OUTPATIENT
Start: 2024-04-08 | End: 2024-04-22

## 2024-04-08 NOTE — PROGRESS NOTES
CC:   Chief Complaint   Patient presents with    Right Knee - Pain          Stephanie Mcarthur is a 48 y.o. female seen today for Pain of the Right Knee  Patient presents today for evaluation right knee pain.  Patient states her many began by the her in 2023.  She works as a .  She denies any fall or injury.  Patient states that she recently started playing pickleball and feels that this has aggravated her right knee pain.  Pain is mostly located on the outside of her knee.  Patient states the pain worsened on Friday and her knee was swollen on Saturday.  Patient reports that she had improvement in pain and swelling with ice and ibuprofen.  No other complaints.      PAST MEDICAL HISTORY:   Past Medical History:   Diagnosis Date    Bell's palsy     Hx of bell's palsy. pt states during pregnancy           PAST SURGICAL HISTORY:   Past Surgical History:   Procedure Laterality Date     SECTION      x4    EYE SURGERY      KNEE SURGERY      left          ALLERGIES: Review of patient's allergies indicates:  No Known Allergies     MEDICATIONS :    Current Outpatient Medications:     acyclovir (ZOVIRAX) 400 MG tablet, Take 1 tablet (400 mg total) by mouth 3 (three) times daily. for 10 days, Disp: 30 tablet, Rfl: 0    amoxicillin-clavulanate 875-125mg (AUGMENTIN) 875-125 mg per tablet, Take 1 tablet by mouth 2 (two) times daily. for 10 days, Disp: 20 tablet, Rfl: 0    cetirizine (ZYRTEC) 10 MG tablet, Take 1 tablet (10 mg total) by mouth once daily., Disp: 30 tablet, Rfl: 3    CRYSELLE, 28, 0.3-30 mg-mcg per tablet, Take 1 tablet by mouth once daily., Disp: , Rfl:     fluticasone propionate (FLONASE) 50 mcg/actuation nasal spray, 1 spray (50 mcg total) by Each Nostril route once daily. (Patient not taking: Reported on 4/3/2024), Disp: 18.2 mL, Rfl: 1    norgestrel-ethinyl estradioL (CRYSELLE, 28,) 0.3-30 mg-mcg per tablet, Take by mouth., Disp: , Rfl:     omeprazole (PRILOSEC) 40  MG capsule, Take 1 capsule (40 mg total) by mouth once daily., Disp: 30 capsule, Rfl: 2    promethazine-dextromethorphan (PROMETHAZINE-DM) 6.25-15 mg/5 mL Syrp, Take 5 mLs by mouth nightly as needed (cough)., Disp: 120 mL, Rfl: 0     SOCIAL HISTORY:   Social History     Socioeconomic History    Marital status:    Tobacco Use    Smoking status: Never    Smokeless tobacco: Never   Substance and Sexual Activity    Alcohol use: Never    Drug use: Never        FAMILY HISTORY:   Family History   Problem Relation Age of Onset    Hypertension Mother     Heart disease Father           PHYSICAL EXAM:      There were no vitals filed for this visit.  Body mass index is 34.38 kg/m².    GENERAL: Well-developed, well-nourished female . The patient is alert, oriented and cooperative.    HEENT:  Normocephalic, atraumatic.  Extraocular movements are intact bilaterally.     NECK:  Nontender with good range of motion.    LUNGS:  Clear to auscultation bilaterally.    HEART:  Regular rate and rhythm.     ABDOMEN:  Soft, non-tender, non-distended.      EXTREMITIES:  Right knee with skin clean dry and intact, mild soft tissue swelling along lateral patella, good range of motion of knee from 0-120 degrees without pain, crepitus noted over patella with movement, knee is stable to varus and valgus stress testing, neurovascularly intact      RADIOGRAPHIC FINDINGS:   X-Ray Knee AP LAT with Sunrise Right    Result Date: 4/8/2024  EXAMINATION: XR KNEE AP LAT WITH SUNRISE RIGHT CLINICAL HISTORY: Pain in right knee TECHNIQUE: XR KNEE AP LAT WITH SUNRISE RIGHT COMPARISON: None FINDINGS: No acute fracture or dislocation. Mild tricompartmental degenerative change No radiopaque foreign bodies.     No acute findings Mild tricompartmental degenerative change Electronically signed by: Nolberto Lima Date:    04/08/2024 Time:    14:56    X-Ray KUB    Result Date: 4/3/2024  EXAMINATION: XR KUB CLINICAL HISTORY: Generalized abdominal painabdominal  pain, nausea, diarrhea; COMPARISON: None TECHNIQUE: Frontal views of the abdomen. FINDINGS: Nonspecific bowel gas pattern.  Moderate to severe levoconvex curvature of the spine.     As above. Point of Service: [facility] Electronically signed by: Juaquin Garland Date:    04/03/2024 Time:    17:00       There are no problems to display for this patient.    IMPRESSION AND PLAN:  Chronic right knee pain.  Personally reviewed x-rays today with mild tricompartmental DJD changes, osteophyte noted lateral patella, no acute fracture or dislocation.  Discussed all treatment options today with the patient.  Continue RICE therapy. Will call in prescription for naproxen for 2 weeks.  Recommend incredi sleeve to wear during activities.  Follow up in 2 weeks if knee pain persists.    No follow-ups on file.       Tabby Carson PA-C      (Subject to voice recognition error, transcription service not allowed)

## 2024-04-09 NOTE — PROGRESS NOTES
Patient notified ,she will have to come back for labs to be drawn.Patient is aware and will come back by.

## 2024-04-11 ENCOUNTER — OFFICE VISIT (OUTPATIENT)
Dept: GASTROENTEROLOGY | Facility: CLINIC | Age: 49
End: 2024-04-11
Payer: COMMERCIAL

## 2024-04-11 VITALS
HEIGHT: 66 IN | SYSTOLIC BLOOD PRESSURE: 129 MMHG | DIASTOLIC BLOOD PRESSURE: 96 MMHG | HEART RATE: 104 BPM | WEIGHT: 217 LBS | BODY MASS INDEX: 34.87 KG/M2

## 2024-04-11 DIAGNOSIS — R11.0 NAUSEA: ICD-10-CM

## 2024-04-11 DIAGNOSIS — R19.7 DIARRHEA, UNSPECIFIED TYPE: ICD-10-CM

## 2024-04-11 DIAGNOSIS — R10.84 GENERALIZED ABDOMINAL PAIN: ICD-10-CM

## 2024-04-11 DIAGNOSIS — R13.10 DYSPHAGIA, UNSPECIFIED TYPE: Primary | ICD-10-CM

## 2024-04-11 DIAGNOSIS — R10.13 EPIGASTRIC PAIN: ICD-10-CM

## 2024-04-11 DIAGNOSIS — R19.4 CHANGE IN BOWEL HABITS: ICD-10-CM

## 2024-04-11 PROCEDURE — 3074F SYST BP LT 130 MM HG: CPT | Mod: CPTII,,,

## 2024-04-11 PROCEDURE — 3008F BODY MASS INDEX DOCD: CPT | Mod: CPTII,,,

## 2024-04-11 PROCEDURE — 1159F MED LIST DOCD IN RCRD: CPT | Mod: CPTII,,,

## 2024-04-11 PROCEDURE — 99214 OFFICE O/P EST MOD 30 MIN: CPT | Mod: PBBFAC

## 2024-04-11 PROCEDURE — 99215 OFFICE O/P EST HI 40 MIN: CPT | Mod: S$PBB,,,

## 2024-04-11 PROCEDURE — 3080F DIAST BP >= 90 MM HG: CPT | Mod: CPTII,,,

## 2024-04-11 PROCEDURE — 3044F HG A1C LEVEL LT 7.0%: CPT | Mod: CPTII,,,

## 2024-04-11 PROCEDURE — 1160F RVW MEDS BY RX/DR IN RCRD: CPT | Mod: CPTII,,,

## 2024-04-11 RX ORDER — POLYETHYLENE GLYCOL 3350, SODIUM SULFATE ANHYDROUS, SODIUM BICARBONATE, SODIUM CHLORIDE, POTASSIUM CHLORIDE 236; 22.74; 6.74; 5.86; 2.97 G/4L; G/4L; G/4L; G/4L; G/4L
4 POWDER, FOR SOLUTION ORAL ONCE
Qty: 4000 ML | Refills: 0 | Status: SHIPPED | OUTPATIENT
Start: 2024-04-11 | End: 2024-04-11

## 2024-04-11 NOTE — PROGRESS NOTES
Gastroenterology Clinic Note    Patient ID: [id number]   Referring MD: Margarita Richards NP   Chief Complaint:   Chief Complaint   Patient presents with    Abdominal Pain    Diarrhea    Nausea       History of Present Illness   Stephanie Mcarthur is an 48 y.o. WF who is referred for abdominal pain. She reports epigastric and LUQ abdominal pain. She will occasionally have lower abdominal pain as well. She complains of intermittent nausea and will alternate diarrhea and formed stool. She denies any hematochezia or melena. She does experienced intermittent dysphagia with certain solid foods such as bread. She was started on Prilosec 40 mg daily within the past week. She denies any prior endoscopy. No FMH of CRC.    Review of Systems   Constitutional:  Negative for weight loss.   Respiratory:  Positive for cough.    Gastrointestinal:  Positive for abdominal pain, diarrhea and nausea. Negative for blood in stool, melena and vomiting.       Past Medical History      Past Medical History:   Diagnosis Date    Bell's palsy     Hx of bell's palsy. pt states during pregnancy        Past Surgical History     Past Surgical History:   Procedure Laterality Date     SECTION      x4    EYE SURGERY      KNEE SURGERY      left       Allergies   Review of patient's allergies indicates:  No Known Allergies    Immunization History     There is no immunization history on file for this patient.    Past Family History      Family History   Problem Relation Age of Onset    Hypertension Mother     Heart disease Father        Past Social History      Social History     Socioeconomic History    Marital status:    Tobacco Use    Smoking status: Never    Smokeless tobacco: Never   Substance and Sexual Activity    Alcohol use: Never    Drug use: Never       Current Medications     Outpatient Medications Marked as Taking for the 24 encounter (Office Visit) with Shauna Fallon FNP   Medication Sig Dispense Refill     "amoxicillin-clavulanate 875-125mg (AUGMENTIN) 875-125 mg per tablet Take 1 tablet by mouth 2 (two) times daily. for 10 days 20 tablet 0    CRYSELLE, 28, 0.3-30 mg-mcg per tablet Take 1 tablet by mouth once daily.      naproxen (NAPROSYN) 500 MG tablet Take 1 tablet (500 mg total) by mouth 2 (two) times daily with meals. for 14 days 28 tablet 0    norgestrel-ethinyl estradioL (CRYSELLE, 28,) 0.3-30 mg-mcg per tablet Take by mouth.      omeprazole (PRILOSEC) 40 MG capsule Take 1 capsule (40 mg total) by mouth once daily. 30 capsule 2    promethazine-dextromethorphan (PROMETHAZINE-DM) 6.25-15 mg/5 mL Syrp Take 5 mLs by mouth nightly as needed (cough). 120 mL 0        I have reviewed the current medications, allergies, vital signs, past medical and surgical history, family medical history, and social history for this encounter and agree with all findings.    OBJECTIVE    Physical Exam    BP (!) 129/96   Pulse 104   Ht 5' 6" (1.676 m)   Wt 98.4 kg (217 lb)   BMI 35.02 kg/m²   GEN: Well appearing, cooperative, NAD  NECK: Supple, no LAD  CV: Normal rate  RESP: Unlabored  ABD: ND, no guarding  EXT: No clubbing, cyanosis, or edema  SKIN: Warm and dry  NEURO: AAO x4.     LABS    CBC (with or without Differential):   Lab Results   Component Value Date    WBC 8.38 04/03/2024    HGB 11.1 (L) 04/03/2024    HCT 39.0 04/03/2024    MCV 70.7 (L) 04/03/2024    MCH 20.1 (L) 04/03/2024    MCHC 28.5 (L) 04/03/2024    RDW 19.5 (H) 04/03/2024     (H) 04/03/2024    MPV 9.6 04/03/2024    NEUTOPHILPCT 56.1 04/03/2024    DIFFTYPE Scan Smear 04/03/2024     BMP/CMP:   Lab Results   Component Value Date     04/03/2024    K 4.1 04/03/2024     (H) 04/03/2024    CO2 26 04/03/2024    BUN 9 04/03/2024    CREATININE 0.73 04/03/2024     (H) 04/03/2024    CALCIUM 9.1 04/03/2024    ALBUMIN 3.5 04/03/2024    AST 19 04/03/2024    ALT 23 04/03/2024    ALKPHOS 129 (H) 04/03/2024        IMAGING  Xray KUB  - Nonspecific bowel gas " pattern     ASSESSMENT  Stephanie Mcarthur is a 48 y.o. WF without significant medical history who is referred for abdominal pain.     1. Dysphagia, unspecified type    2. Generalized abdominal pain    3. Diarrhea, unspecified type    4. Nausea    5. Epigastric pain    6. Change in bowel habits           PLAN    - Continue Prilosec 40 mg daily   - Schedule EGD w/ possible dilation for epigastric pain, nausea, dysphagia   - Iron studies today   - Schedule US Abdomen for epigastric pain  - Schedule colonoscopy for change in bowel habits, abdominal pain; no prior endoscopy     There are no Patient Instructions on file for this visit.      Orders Placed This Encounter   Procedures    US Abdomen Complete     Standing Status:   Future     Standing Expiration Date:   4/11/2025     Order Specific Question:   May the Radiologist modify the order per protocol to meet the clinical needs of the patient?     Answer:   Yes     Order Specific Question:   Release to patient     Answer:   Immediate    Ferritin     Standing Status:   Future     Standing Expiration Date:   6/11/2025    Iron and TIBC     Standing Status:   Future     Standing Expiration Date:   6/11/2025         The risks and benefits of my recommendations, as well as other treatment options were discussed with the patient today. All questions were answered.    45 minutes of total time spent on the encounter, which includes face to face time and non-face to face time preparing to see the patient (eg, review of tests), obtaining and/or reviewing separately obtained history, documenting clinical information in the electronic or other health record, Independently interpreting results (not separately reported) and communicating results to the patient/family/caregiver, or care coordination (not separately reported).        Shauna Fallon, ANGELAP/ACNP  [facility]

## 2024-04-16 ENCOUNTER — TELEPHONE (OUTPATIENT)
Dept: GASTROENTEROLOGY | Facility: CLINIC | Age: 49
End: 2024-04-16
Payer: COMMERCIAL

## 2024-04-16 DIAGNOSIS — D50.9 IRON DEFICIENCY ANEMIA, UNSPECIFIED IRON DEFICIENCY ANEMIA TYPE: Primary | ICD-10-CM

## 2024-04-16 RX ORDER — FERROUS SULFATE 325(65) MG
325 TABLET, DELAYED RELEASE (ENTERIC COATED) ORAL DAILY
Qty: 30 TABLET | Refills: 3 | Status: SHIPPED | OUTPATIENT
Start: 2024-04-16

## 2024-04-16 NOTE — TELEPHONE ENCOUNTER
----- Message from YVETTE Moraes sent at 4/16/2024 10:26 AM CDT -----  Results show iron deficiency so I have sent rx to her pharmacy for daily oral replacement

## 2024-05-13 ENCOUNTER — HOSPITAL ENCOUNTER (OUTPATIENT)
Dept: RADIOLOGY | Facility: HOSPITAL | Age: 49
Discharge: HOME | End: 2024-05-13
Payer: COMMERCIAL

## 2024-05-13 DIAGNOSIS — R10.13 EPIGASTRIC PAIN: ICD-10-CM

## 2024-05-13 PROCEDURE — 76700 US EXAM ABDOM COMPLETE: CPT | Mod: TC

## 2024-05-13 PROCEDURE — 76700 US EXAM ABDOM COMPLETE: CPT | Mod: 26,,, | Performed by: RADIOLOGY

## 2024-05-14 ENCOUNTER — TELEPHONE (OUTPATIENT)
Dept: GASTROENTEROLOGY | Facility: CLINIC | Age: 49
End: 2024-05-14
Payer: COMMERCIAL

## 2024-05-14 DIAGNOSIS — K80.20 CALCULUS OF GALLBLADDER WITHOUT CHOLECYSTITIS WITHOUT OBSTRUCTION: Primary | ICD-10-CM

## 2024-05-14 NOTE — TELEPHONE ENCOUNTER
----- Message from YVETTE Moraes sent at 5/14/2024 10:44 AM CDT -----  She has a 7 mm gallstone. I will place referral to gen surgery for consult.

## 2024-05-14 NOTE — TELEPHONE ENCOUNTER
Left VM for patient to return call to discuss results.    Mammogram due 12/2020.  RTC with Dr. Barnes in 6 months.  Scheduling of these appointments deferred until 11/22/20 per Vonnie Jernigan in Scheduling Department.  Ting Norwood RN, BSN, OCN on 7/28/2020 at 3:37 PM

## 2024-05-21 ENCOUNTER — OFFICE VISIT (OUTPATIENT)
Dept: SURGERY | Facility: CLINIC | Age: 49
End: 2024-05-21
Attending: SURGERY
Payer: COMMERCIAL

## 2024-05-21 ENCOUNTER — CLINICAL SUPPORT (OUTPATIENT)
Dept: CARDIOLOGY | Facility: CLINIC | Age: 49
End: 2024-05-21
Attending: SURGERY
Payer: COMMERCIAL

## 2024-05-21 VITALS — HEART RATE: 78 BPM | DIASTOLIC BLOOD PRESSURE: 86 MMHG | OXYGEN SATURATION: 100 % | SYSTOLIC BLOOD PRESSURE: 136 MMHG

## 2024-05-21 DIAGNOSIS — K80.20 CALCULUS OF GALLBLADDER WITHOUT CHOLECYSTITIS WITHOUT OBSTRUCTION: Primary | ICD-10-CM

## 2024-05-21 DIAGNOSIS — K80.20 CALCULUS OF GALLBLADDER WITHOUT CHOLECYSTITIS WITHOUT OBSTRUCTION: ICD-10-CM

## 2024-05-21 PROCEDURE — 1159F MED LIST DOCD IN RCRD: CPT | Mod: CPTII,,, | Performed by: SURGERY

## 2024-05-21 PROCEDURE — 3075F SYST BP GE 130 - 139MM HG: CPT | Mod: CPTII,,, | Performed by: SURGERY

## 2024-05-21 PROCEDURE — 99214 OFFICE O/P EST MOD 30 MIN: CPT | Mod: PBBFAC | Performed by: SURGERY

## 2024-05-21 PROCEDURE — 99204 OFFICE O/P NEW MOD 45 MIN: CPT | Mod: S$PBB,,, | Performed by: SURGERY

## 2024-05-21 PROCEDURE — 3079F DIAST BP 80-89 MM HG: CPT | Mod: CPTII,,, | Performed by: SURGERY

## 2024-05-21 PROCEDURE — 99212 OFFICE O/P EST SF 10 MIN: CPT | Mod: PBBFAC,25

## 2024-05-21 PROCEDURE — 93010 ELECTROCARDIOGRAM REPORT: CPT | Mod: S$PBB,,, | Performed by: INTERNAL MEDICINE

## 2024-05-21 PROCEDURE — 3044F HG A1C LEVEL LT 7.0%: CPT | Mod: CPTII,,, | Performed by: SURGERY

## 2024-05-21 PROCEDURE — 93005 ELECTROCARDIOGRAM TRACING: CPT | Mod: PBBFAC | Performed by: INTERNAL MEDICINE

## 2024-05-21 NOTE — PROGRESS NOTES
"Subjective:       Patient ID: Stephanie Mcarthur is a 49 y.o. female.    Chief Complaint: Cholelithiasis (Pt is here to schedule gallbladder surgery due to gallstones. Stated by patient.)    49-year-old female patient with a history of "stomach pains" that led to performance of an ultrasound.  She reports that she often as pain after eating, but that it is intermittent with resolution after a few hours.  She reports that the pain is in the supraumbilical region and radiates to the lateral abdomen on the right side.  Her ultrasound did reveal a single gallstone without gallbladder wall thickening.  The patient has been referred for further management.  Incidentally, she has an ongoing GI workup which will include EGD and colonoscopy in a few more weeks.        family history includes Heart disease in her father; Hypertension in her mother.  Past Medical History:   Diagnosis Date    Bell's palsy     Hx of bell's palsy. pt states during pregnancy       Past Surgical History:   Procedure Laterality Date     SECTION      x4    EYE SURGERY      KNEE SURGERY      left       reports that she has never smoked. She has never used smokeless tobacco. She reports that she does not drink alcohol and does not use drugs.     Review of Systems   All other systems reviewed and are negative.         Objective:      /86   Pulse 78   LMP 2024 (Approximate)   SpO2 100%    Physical Exam  Constitutional:       Appearance: She is obese.   Eyes:      General: No scleral icterus.  Cardiovascular:      Heart sounds: Normal heart sounds.   Pulmonary:      Effort: Pulmonary effort is normal.      Breath sounds: Normal breath sounds.   Abdominal:      Palpations: Abdomen is soft. There is no mass.      Tenderness: There is no abdominal tenderness.      Hernia: No hernia is present.   Musculoskeletal:         General: No swelling.      Right lower leg: No edema.   Skin:     General: Skin is warm.   Neurological:      General: No " focal deficit present.      Motor: No weakness.           Assessment/Plan:     Problem List Items Addressed This Visit          GI    Calculus of gallbladder without cholecystitis without obstruction - Primary    Current Assessment & Plan     Risks and benefits of surgery discussed to include infection, bleeding, hernia, possible drain, duct injury, retained stones, open conversion, possible need for postop ERCP or further surgery, and unforeseen.  Patient expresses understanding and wishes to proceed.          Relevant Orders    Comprehensive Metabolic Panel (Completed)    CBC Auto Differential (Completed)    EKG 12-lead    Ambulatory Referral to External Surgery

## 2024-05-21 NOTE — PATIENT INSTRUCTIONS
[facility]  [address] [place] [address]      YOUR SURGERY DATE IS 6/10/24    LABWORK AND EKG IS SCHEDULED FOR today. PLEASE SIGN IN ON 1ST FLOOR   OF THE [facility] FOR LAB.  EKG IS LOCATED ON 2ND FLOOR.      DO NOT EAT OR DRINK ANYTHING AFTER MIDNIGHT BEFORE YOUR SURGERY    BRING ALL MEDICATIONS YOU ARE CURRENTLY TAKING WITH YOU.      Medication instructions:  You may take blood pressure medication with a small drink of water the morning of surgery.        IF YOU ARE ON ANY OF THESE BLOOD THINNERS, MAKE SURE YOUR PHYSICIAN IS AWARE.  Eliquis/Apixaban            Wafarin/Coumadin,Jantoven  Xarelto/Rivaroxaban      Pletal/Cilostazol  Plavix/Clopidogrel          Pradaxa/Dibigatran    If you are diabetic    Follow the diabetic medicine instructions you received during your pre-operative visit.  DO NOT take your insulin or diabetic medications the morning of surgery.  When you arrive at the surgical center, be sure to tell the nurse you are diabetic.    The following blood sugar medications have to be stopped prior to surgery:    Hold 24 hours prior to surgery:    Libraglutide - Saxenda   Adlyxen - Lixixerutose  Byetta - Exenatide  Saxenda - Liralutide   Victoza    Hold 1 week prior to surgery:    Semaglutide - Ozempic, Wegouy, Rybelsus  Dulaglutide - Trulicity  Tiazepatide - Mounjaro  Bydurcon    Hold 48 hours prior to surgery:    Metformin, Glucovance, Metaglip, Fortamet, Glucophage, Riomet, Avandamet, Glimepiride    You will receive at text or call from [facility] regarding your surgery date and time. Please complete your registration at this time. If you have questions, or you are unable to complete this registration, please call [facility] at [phone] to secure your surgery date and time.    A STAFF MEMBER FROM THE [facility] WILL CALL YOU THE DAY BEFORE  SURGERY TO LET YOU KNOW WHAT TIME TO ARRIVE THE MORNING OF SURGERY.  IF YOU HAVE  NOT HEARD FROM THEM BY 4 P.M. THE DAY BEFORE YOUR SURGERY,   PLEASE CALL THEM -[phone].      IF YOU HAVE ANY QUESTIONS, PLEASE CONTACT OUR OFFICE -[phone].

## 2024-05-25 LAB
OHS QRS DURATION: 80 MS
OHS QTC CALCULATION: 404 MS

## 2024-06-05 ENCOUNTER — TELEPHONE (OUTPATIENT)
Dept: SURGERY | Facility: CLINIC | Age: 49
End: 2024-06-05
Payer: COMMERCIAL

## 2024-06-05 NOTE — TELEPHONE ENCOUNTER
----- Message from Hilda Segovia sent at 6/5/2024 11:41 AM CDT -----  Regarding: MC  Who Called: Stephanie Mcarthur  PATIENT HAS ANOTHER QUESTION THAT SHE NEED TO ASK YOU   Preferred Method of Contact: Phone Call  Patient's Preferred Phone Number on File: [phone]   Best Call Back Number, if different:  Additional Information: ABOUT MOVING HER SURGERY TO THE HOSPITAL NOT THE SURGERY CENTER

## 2024-06-05 NOTE — TELEPHONE ENCOUNTER
----- Message from Hilda Segovia sent at 6/5/2024 11:41 AM CDT -----  Regarding: MC  Who Called: Stephanie Mcarthur  PATIENT HAS ANOTHER QUESTION THAT SHE NEED TO ASK YOU   Preferred Method of Contact: Phone Call  Patient's Preferred Phone Number on File: [phone]   Best Call Back Number, if different:  Additional Information: ABOUT MOVING HER SURGERY TO THE HOSPITAL NOT THE SURGERY CENTER

## 2024-06-10 ENCOUNTER — OUTSIDE PLACE OF SERVICE (OUTPATIENT)
Dept: SURGERY | Facility: CLINIC | Age: 49
End: 2024-06-10
Payer: COMMERCIAL

## 2024-06-10 ENCOUNTER — LAB REQUISITION (OUTPATIENT)
Dept: LAB | Facility: HOSPITAL | Age: 49
End: 2024-06-10
Attending: SURGERY
Payer: COMMERCIAL

## 2024-06-10 DIAGNOSIS — K80.00 CALCULUS OF GALLBLADDER WITH ACUTE CHOLECYSTITIS WITHOUT OBSTRUCTION: ICD-10-CM

## 2024-06-10 PROCEDURE — 47563 LAPARO CHOLECYSTECTOMY/GRAPH: CPT | Mod: ,,, | Performed by: SURGERY

## 2024-06-10 PROCEDURE — 88304 TISSUE EXAM BY PATHOLOGIST: CPT | Mod: 26,,, | Performed by: PATHOLOGY

## 2024-06-10 PROCEDURE — 88304 TISSUE EXAM BY PATHOLOGIST: CPT | Mod: TC,SUR | Performed by: SURGERY

## 2024-06-11 LAB
ESTROGEN SERPL-MCNC: NORMAL PG/ML
INSULIN SERPL-ACNC: NORMAL U[IU]/ML
LAB AP CLINICAL INFORMATION: NORMAL
LAB AP GROSS DESCRIPTION: NORMAL
LAB AP LABORATORY NOTES: NORMAL
T3RU NFR SERPL: NORMAL %

## 2024-06-13 DIAGNOSIS — K80.20 CALCULUS OF GALLBLADDER WITHOUT CHOLECYSTITIS WITHOUT OBSTRUCTION: Primary | ICD-10-CM

## 2024-06-13 RX ORDER — ONDANSETRON 4 MG/1
4 TABLET, ORALLY DISINTEGRATING ORAL EVERY 8 HOURS PRN
Qty: 20 TABLET | Refills: 0 | Status: SHIPPED | OUTPATIENT
Start: 2024-06-13

## 2024-06-13 NOTE — PROGRESS NOTES
Called requesting something for nausea.  Otherwise, doing well.  Afebrile.  Daily bowel movements.  Pain controlled with [place].  Zofran sent to pharmacy.

## 2024-06-19 NOTE — PROGRESS NOTES
"Subjective:       Stephanie Mcarthur is a 49 y.o. female who presents for evaluation of symptoms of a URI, possible sinusitis. Symptoms include congestion, cough described as productive, post nasal drip, and sneezing. Onset of symptoms was several days ago, and has been gradually worsening since that time. Treatment to date: none.    Patient also reports nausea, diarrhea, and abdominal pain x several weeks. She states that it doesn't matter what she eats, she has to go to the bathroom almost immediately after. She also has an abdominal hernia at umbilicus and is unsure if this could be related. Abdominal pain is generalized and she denies vomiting. She does not currently have a GI doctor.     Lastly, she would like lab work done as she is due.     Review of Systems  Pertinent items are noted in HPI.     Objective:      /70 (BP Location: Left arm, Patient Position: Sitting, BP Method: Large (Manual))   Pulse 75   Temp 98.4 °F (36.9 °C) (Oral)   Resp 16   Ht 5' 6" (1.676 m)   Wt 96.7 kg (213 lb 1.6 oz)   SpO2 99%   BMI 34.40 kg/m²   General appearance: alert, appears stated age, and cooperative  Head: Normocephalic, without obvious abnormality, atraumatic  Eyes: conjunctivae/corneas clear. PERRL, EOM's intact. Fundi benign.  Ears: abnormal TM right ear - dull and abnormal TM left ear - dull  Nose: Nares normal. Septum midline. Mucosa normal. No drainage or sinus tenderness., mild congestion, sinus tenderness bilateral  Throat: lips, mucosa, and tongue normal; teeth and gums normal and PND  Lungs: clear to auscultation bilaterally  Heart: regular rate and rhythm, S1, S2 normal, no murmur, click, rub or gallop  Abdomen: abnormal findings:  hyperactive bowel sounds and umbilical hernia  Extremities: extremities normal, atraumatic, no cyanosis or edema  Skin: Skin color, texture, turgor normal. No rashes or lesions  Lymph nodes: Cervical, supraclavicular, and axillary nodes normal.  Neurologic: Alert and oriented " X 3, normal strength and tone. Normal symmetric reflexes. Normal coordination and gait     Assessment:      sinusitis and abdominal pain with N/D     Plan:      Discussed diagnosis and treatment of URI.  Suggested symptomatic OTC remedies.  Nasal saline spray for congestion.  Augmentin per orders.  Follow up as needed.   Referral to GI in progress. Start PPI. KUB and abdominal ultrasound ordered. Will follow up with results.

## 2024-06-24 ENCOUNTER — OFFICE VISIT (OUTPATIENT)
Dept: SURGERY | Facility: CLINIC | Age: 49
End: 2024-06-24
Attending: SURGERY
Payer: COMMERCIAL

## 2024-06-24 DIAGNOSIS — Z09 POSTOP CHECK: Primary | ICD-10-CM

## 2024-06-24 PROCEDURE — 99999 PR PBB SHADOW E&M-EST. PATIENT-LVL III: CPT | Mod: PBBFAC,,, | Performed by: SURGERY

## 2024-06-24 PROCEDURE — 1159F MED LIST DOCD IN RCRD: CPT | Mod: CPTII,,, | Performed by: SURGERY

## 2024-06-24 PROCEDURE — 3044F HG A1C LEVEL LT 7.0%: CPT | Mod: CPTII,,, | Performed by: SURGERY

## 2024-06-24 PROCEDURE — 99213 OFFICE O/P EST LOW 20 MIN: CPT | Mod: PBBFAC | Performed by: SURGERY

## 2024-06-24 PROCEDURE — 99024 POSTOP FOLLOW-UP VISIT: CPT | Mod: ,,, | Performed by: SURGERY

## 2024-06-25 NOTE — PROGRESS NOTES
S/p lap marine and UHR  C/o pain at umbilical incision  Reports reflux symptoms    No evidence of bulge at umbilicus    Scheduled for EGD soon.   F/u prn if any more issues

## 2024-07-15 ENCOUNTER — OFFICE VISIT (OUTPATIENT)
Dept: FAMILY MEDICINE | Facility: CLINIC | Age: 49
End: 2024-07-15
Payer: COMMERCIAL

## 2024-07-15 VITALS
SYSTOLIC BLOOD PRESSURE: 124 MMHG | HEIGHT: 66 IN | TEMPERATURE: 97 F | OXYGEN SATURATION: 97 % | RESPIRATION RATE: 18 BRPM | WEIGHT: 214.31 LBS | DIASTOLIC BLOOD PRESSURE: 80 MMHG | BODY MASS INDEX: 34.44 KG/M2 | HEART RATE: 82 BPM

## 2024-07-15 DIAGNOSIS — R73.03 PREDIABETES: ICD-10-CM

## 2024-07-15 DIAGNOSIS — S33.5XXA SPRAIN OF LOW BACK, INITIAL ENCOUNTER: ICD-10-CM

## 2024-07-15 DIAGNOSIS — E61.1 IRON DEFICIENCY: ICD-10-CM

## 2024-07-15 DIAGNOSIS — N92.6 IRREGULAR MENSES: Primary | ICD-10-CM

## 2024-07-15 DIAGNOSIS — Z12.31 BREAST CANCER SCREENING BY MAMMOGRAM: ICD-10-CM

## 2024-07-15 PROCEDURE — 99213 OFFICE O/P EST LOW 20 MIN: CPT | Mod: 25,,, | Performed by: NURSE PRACTITIONER

## 2024-07-15 PROCEDURE — 1160F RVW MEDS BY RX/DR IN RCRD: CPT | Mod: CPTII,,, | Performed by: NURSE PRACTITIONER

## 2024-07-15 PROCEDURE — 1159F MED LIST DOCD IN RCRD: CPT | Mod: CPTII,,, | Performed by: NURSE PRACTITIONER

## 2024-07-15 PROCEDURE — 96372 THER/PROPH/DIAG INJ SC/IM: CPT | Mod: ,,, | Performed by: NURSE PRACTITIONER

## 2024-07-15 PROCEDURE — 3044F HG A1C LEVEL LT 7.0%: CPT | Mod: CPTII,,, | Performed by: NURSE PRACTITIONER

## 2024-07-15 PROCEDURE — 3079F DIAST BP 80-89 MM HG: CPT | Mod: CPTII,,, | Performed by: NURSE PRACTITIONER

## 2024-07-15 PROCEDURE — 3074F SYST BP LT 130 MM HG: CPT | Mod: CPTII,,, | Performed by: NURSE PRACTITIONER

## 2024-07-15 PROCEDURE — 3008F BODY MASS INDEX DOCD: CPT | Mod: CPTII,,, | Performed by: NURSE PRACTITIONER

## 2024-07-15 RX ORDER — KETOROLAC TROMETHAMINE 30 MG/ML
60 INJECTION, SOLUTION INTRAMUSCULAR; INTRAVENOUS
Status: COMPLETED | OUTPATIENT
Start: 2024-07-15 | End: 2024-07-15

## 2024-07-15 RX ADMIN — KETOROLAC TROMETHAMINE 60 MG: 30 INJECTION, SOLUTION INTRAMUSCULAR; INTRAVENOUS at 01:07

## 2024-07-15 NOTE — PROGRESS NOTES
YVETTE Villanueva   [facility]  [address] [facility] [address]  [phone]      PATIENT NAME: Stephanie Mcarthur  : 1975  DATE: 7/15/24  MRN: [id number]      Billing Provider: YVETTE Villanueva  Level of Service:   Patient PCP Information       Provider PCP Type    Primary Doctor No General            Reason for Visit / Chief Complaint: Follow-up (Would like to discuss recent lab ) and Back Pain (Lower back pain X 5 days, recently started playing softball )       Update PCP  Update Chief Complaint         History of Present Illness / Problem Focused Workflow     Stephanie Mcarthur presents to the clinic with Follow-up (Would like to discuss recent lab ) and Back Pain (Lower back pain X 5 days, recently started playing softball )     Presents today for a couple of reasons.  She underwent lab work back in April.  She would like to further investigate the findings on that lab.  She is aware that she was noted to be prediabetic.  She has made some modest lifestyle changes but is unsure when she would need to have that lab rechecked.  She reports that she continues to feel as if she is gaining weight, despite improve lifestyle and exercise.  She does continue to have occasional, irregular menses.  Flow is not heavy when it occurs.  She does have a history of very heavy menstrual cycles.  She does have hot flashes at times.  She reports that her Pap is overdue and she has never had a mammogram.  She does have a scheduled colonoscopy and EGD next month.  She denies blood in stool or changes in bowel habits.    She reports that she is having some low back pain.  She recently began playing softball and feels that she may have strained her lower back.  She denies loss of bowel or bladder control or paresthesia.    Back Pain  Pertinent negatives include no chest pain, dysuria or weakness.       Review of Systems     Review of Systems   Constitutional:  Positive for  unexpected weight change. Negative for fatigue.   Respiratory:  Negative for cough.    Cardiovascular:  Negative for chest pain, palpitations and leg swelling.   Gastrointestinal:  Negative for blood in stool, change in bowel habit, diarrhea, nausea, vomiting and reflux.   Endocrine: Negative for polydipsia and polyuria.   Genitourinary:  Positive for hot flashes and menstrual irregularity. Negative for dysuria and vaginal pain.   Musculoskeletal:  Positive for back pain. Negative for arthralgias and gait problem.   Neurological:  Negative for weakness.   Psychiatric/Behavioral:  Negative for sleep disturbance.         Denies depression  Denies anxiety        Medical / Social / Family History     Past Medical History:   Diagnosis Date    Bell's palsy     Hx of bell's palsy. pt states during pregnancy        Past Surgical History:   Procedure Laterality Date     SECTION      x4    EYE SURGERY      GALLBLADDER SURGERY  06/10/2024    KNEE SURGERY      left       Social History  Ms. Mcarthur  reports that she has never smoked. She has never been exposed to tobacco smoke. She has never used smokeless tobacco. She reports that she does not drink alcohol and does not use drugs.    Family History  Ms. Mcarthur's family history includes Heart disease in her father; Hypertension in her mother.    Medications and Allergies     Medications  Outpatient Medications Marked as Taking for the 7/15/24 encounter (Office Visit) with Dona Ling FNP   Medication Sig Dispense Refill    ferrous sulfate 325 (65 FE) MG EC tablet Take 1 tablet (325 mg total) by mouth once daily. 30 tablet 3    omeprazole (PRILOSEC) 40 MG capsule Take 1 capsule (40 mg total) by mouth once daily. 30 capsule 2       Allergies  Review of patient's allergies indicates:  No Known Allergies    Physical Examination     Vitals:    07/15/24 1145   BP: 124/80   Pulse: 82   Resp: 18   Temp: 97.3 °F (36.3 °C)     Physical Exam  Vitals and nursing note reviewed.    Constitutional:       Appearance: Normal appearance. She is obese. She is not ill-appearing.   HENT:      Head: Normocephalic and atraumatic.   Eyes:      General: No scleral icterus.     Conjunctiva/sclera: Conjunctivae normal.   Cardiovascular:      Rate and Rhythm: Normal rate and regular rhythm.      Pulses: Normal pulses.      Heart sounds: Normal heart sounds. No murmur heard.  Pulmonary:      Effort: Pulmonary effort is normal. No respiratory distress.      Breath sounds: Normal breath sounds.   Abdominal:      General: Bowel sounds are normal. There is no distension.      Palpations: Abdomen is soft.   Musculoskeletal:      Cervical back: Normal range of motion and neck supple.      Right lower leg: No edema.      Left lower leg: No edema.      Comments: LS paraspinal tenderness present   Skin:     General: Skin is warm and dry.      Capillary Refill: Capillary refill takes 2 to 3 seconds.      Coloration: Skin is not jaundiced.      Findings: No rash.   Neurological:      General: No focal deficit present.      Mental Status: She is alert and oriented to person, place, and time. Mental status is at baseline.      Motor: No weakness.      Comments: Ambulates without difficulty   Psychiatric:         Mood and Affect: Mood normal.         Behavior: Behavior normal.         Thought Content: Thought content normal.         Judgment: Judgment normal.          Lab Results   Component Value Date    WBC 7.41 05/21/2024    HGB 11.6 (L) 05/21/2024    HCT 39.7 05/21/2024    MCV 71.0 (L) 05/21/2024     05/21/2024        Sodium   Date Value Ref Range Status   05/21/2024 139 136 - 145 mmol/L Final     Potassium   Date Value Ref Range Status   05/21/2024 4.2 3.5 - 5.1 mmol/L Final     Chloride   Date Value Ref Range Status   05/21/2024 108 (H) 98 - 107 mmol/L Final     CO2   Date Value Ref Range Status   05/21/2024 28 21 - 32 mmol/L Final     Glucose   Date Value Ref Range Status   05/21/2024 104 74 - 106 mg/dL  "Final     BUN   Date Value Ref Range Status   05/21/2024 11 7 - 18 mg/dL Final     Creatinine   Date Value Ref Range Status   05/21/2024 0.76 0.55 - 1.02 mg/dL Final     Calcium   Date Value Ref Range Status   05/21/2024 8.9 8.5 - 10.1 mg/dL Final     Total Protein   Date Value Ref Range Status   05/21/2024 7.6 6.4 - 8.2 g/dL Final     Albumin   Date Value Ref Range Status   05/21/2024 3.5 3.5 - 5.0 g/dL Final     Bilirubin, Total   Date Value Ref Range Status   05/21/2024 0.3 >0.0 - 1.2 mg/dL Final     Alk Phos   Date Value Ref Range Status   05/21/2024 108 (H) 39 - 100 U/L Final     AST   Date Value Ref Range Status   05/21/2024 26 15 - 37 U/L Final     ALT   Date Value Ref Range Status   05/21/2024 28 13 - 56 U/L Final     Anion Gap   Date Value Ref Range Status   05/21/2024 7 7 - 16 mmol/L Final     eGFR   Date Value Ref Range Status   05/21/2024 97 >=60 mL/min/1.73m2 Final      Lab Results   Component Value Date    HGBA1C 5.8 04/03/2024      Lab Results   Component Value Date    CHOL 179 04/03/2024     Lab Results   Component Value Date    HDL 47 04/03/2024     Lab Results   Component Value Date    LDLCALC 108 04/03/2024     No results found for: "DLDL"  Lab Results   Component Value Date    TRIG 118 04/03/2024     Lab Results   Component Value Date    CHOLHDL 3.8 04/03/2024      Lab Results   Component Value Date    TSH 0.852 04/03/2024        Assessment and Plan (including Health Maintenance)      Problem List  Smart Sets  Document Outside HM   :    Plan:     1. Irregular menses  -     Ambulatory referral/consult to Gynecology; Future; Expected date: 07/22/2024    2. Breast cancer screening by mammogram  -     Mammo Digital Screening Bilat w/ Stephane; Future; Expected date: 07/15/2024    3. Iron deficiency    4. Prediabetes         There are no Patient Instructions on file for this visit.     Health Maintenance Due   Topic Date Due    Hepatitis C Screening  Never done    Cervical Cancer Screening  Never done    " HIV Screening  Never done    TETANUS VACCINE  Never done    Mammogram  Never done    Colorectal Cancer Screening  Never done    COVID-19 Vaccine (1 - 2023-24 season) Never done         Health Maintenance Topics with due status: Not Due       Topic Last Completion Date    Hemoglobin A1c (Diabetic Prevention Screening) 04/03/2024    Lipid Panel 04/03/2024    Influenza Vaccine Not Due       Future Appointments   Date Time Provider Department Center   8/12/2024 12:00 PM [facility] GI ROOM 01 [facility]   8/19/2024  9:30 AM [facility] GI ROOM 01 [facility]   10/11/2024 10:40 AM Shauna Fallon FNP [facility]        We will set up routine mammogram.  She would also like to be referred to [facility] for hormone replacement therapy consideration.  We will also administer Toradol IM today to help with low back pain.    Signature:  YVETTE Villanueva  [facility]  [address] [facility] [address]  [phone]    Date of encounter: 7/15/24

## 2024-07-23 ENCOUNTER — HOSPITAL ENCOUNTER (OUTPATIENT)
Dept: RADIOLOGY | Facility: HOSPITAL | Age: 49
Discharge: HOME | End: 2024-07-23
Attending: NURSE PRACTITIONER
Payer: COMMERCIAL

## 2024-07-23 VITALS — WEIGHT: 214 LBS | BODY MASS INDEX: 34.39 KG/M2 | HEIGHT: 66 IN

## 2024-07-23 DIAGNOSIS — Z12.31 BREAST CANCER SCREENING BY MAMMOGRAM: ICD-10-CM

## 2024-07-23 PROCEDURE — 77063 BREAST TOMOSYNTHESIS BI: CPT | Mod: TC

## 2024-07-23 PROCEDURE — 77067 SCR MAMMO BI INCL CAD: CPT | Mod: TC

## 2024-07-29 ENCOUNTER — HOSPITAL ENCOUNTER (OUTPATIENT)
Dept: RADIOLOGY | Facility: HOSPITAL | Age: 49
Discharge: HOME | End: 2024-07-29
Attending: RADIOLOGY
Payer: COMMERCIAL

## 2024-07-29 DIAGNOSIS — R92.8 ABNORMAL MAMMOGRAM: ICD-10-CM

## 2024-07-29 PROCEDURE — 76642 ULTRASOUND BREAST LIMITED: CPT | Mod: TC,LT

## 2024-07-31 ENCOUNTER — HOSPITAL ENCOUNTER (OUTPATIENT)
Dept: RADIOLOGY | Facility: HOSPITAL | Age: 49
Discharge: HOME | End: 2024-07-31
Attending: RADIOLOGY
Payer: COMMERCIAL

## 2024-07-31 DIAGNOSIS — R92.8 ABNORMAL MAMMOGRAM: ICD-10-CM

## 2024-07-31 PROCEDURE — 27201044 HC MAMMOTOME PROBE

## 2024-07-31 PROCEDURE — 88305 TISSUE EXAM BY PATHOLOGIST: CPT | Mod: TC,SUR | Performed by: RADIOLOGY

## 2024-07-31 PROCEDURE — 19083 BX BREAST 1ST LESION US IMAG: CPT | Mod: LT

## 2024-07-31 PROCEDURE — 77065 DX MAMMO INCL CAD UNI: CPT | Mod: TC,LT

## 2024-07-31 PROCEDURE — A4648 IMPLANTABLE TISSUE MARKER: HCPCS

## 2024-07-31 PROCEDURE — 27200940 HC BIOPSY TRAY

## 2024-07-31 PROCEDURE — 77061 BREAST TOMOSYNTHESIS UNI: CPT | Mod: TC,LT

## 2024-08-12 ENCOUNTER — ANESTHESIA (OUTPATIENT)
Dept: GASTROENTEROLOGY | Facility: HOSPITAL | Age: 49
End: 2024-08-12
Payer: COMMERCIAL

## 2024-08-12 ENCOUNTER — ANESTHESIA EVENT (OUTPATIENT)
Dept: GASTROENTEROLOGY | Facility: HOSPITAL | Age: 49
End: 2024-08-12
Payer: COMMERCIAL

## 2024-08-12 ENCOUNTER — HOSPITAL ENCOUNTER (OUTPATIENT)
Dept: GASTROENTEROLOGY | Facility: HOSPITAL | Age: 49
Discharge: HOME | End: 2024-08-12
Admitting: INTERNAL MEDICINE
Payer: COMMERCIAL

## 2024-08-12 VITALS
WEIGHT: 210 LBS | OXYGEN SATURATION: 99 % | SYSTOLIC BLOOD PRESSURE: 144 MMHG | HEIGHT: 65 IN | HEART RATE: 73 BPM | RESPIRATION RATE: 21 BRPM | DIASTOLIC BLOOD PRESSURE: 89 MMHG | BODY MASS INDEX: 34.99 KG/M2 | TEMPERATURE: 98 F

## 2024-08-12 DIAGNOSIS — R19.4 CHANGE IN BOWEL HABITS: ICD-10-CM

## 2024-08-12 DIAGNOSIS — R10.84 GENERALIZED ABDOMINAL PAIN: ICD-10-CM

## 2024-08-12 LAB
B-HCG UR QL: NEGATIVE
CTP QC/QA: YES

## 2024-08-12 PROCEDURE — D9220A PRA ANESTHESIA: Mod: ,,, | Performed by: NURSE ANESTHETIST, CERTIFIED REGISTERED

## 2024-08-12 PROCEDURE — 88305 TISSUE EXAM BY PATHOLOGIST: CPT | Mod: TC,91,SUR | Performed by: INTERNAL MEDICINE

## 2024-08-12 PROCEDURE — 37000009 HC ANESTHESIA EA ADD 15 MINS

## 2024-08-12 PROCEDURE — 25000003 PHARM REV CODE 250: Performed by: NURSE ANESTHETIST, CERTIFIED REGISTERED

## 2024-08-12 PROCEDURE — 45380 COLONOSCOPY AND BIOPSY: CPT | Mod: ,,, | Performed by: INTERNAL MEDICINE

## 2024-08-12 PROCEDURE — 37000008 HC ANESTHESIA 1ST 15 MINUTES

## 2024-08-12 PROCEDURE — 81025 URINE PREGNANCY TEST: CPT | Performed by: INTERNAL MEDICINE

## 2024-08-12 PROCEDURE — 88305 TISSUE EXAM BY PATHOLOGIST: CPT | Mod: 26,,, | Performed by: PATHOLOGY

## 2024-08-12 PROCEDURE — 27000284 HC CANNULA NASAL: Performed by: NURSE ANESTHETIST, CERTIFIED REGISTERED

## 2024-08-12 PROCEDURE — 45380 COLONOSCOPY AND BIOPSY: CPT | Performed by: INTERNAL MEDICINE

## 2024-08-12 RX ORDER — SODIUM CHLORIDE 0.9 % (FLUSH) 0.9 %
10 SYRINGE (ML) INJECTION
Status: DISCONTINUED | OUTPATIENT
Start: 2024-08-12 | End: 2024-08-13 | Stop reason: HOSPADM

## 2024-08-12 RX ORDER — PROPOFOL 10 MG/ML
VIAL (ML) INTRAVENOUS
Status: DISCONTINUED | OUTPATIENT
Start: 2024-08-12 | End: 2024-08-12

## 2024-08-12 RX ORDER — LIDOCAINE HYDROCHLORIDE 20 MG/ML
INJECTION, SOLUTION EPIDURAL; INFILTRATION; INTRACAUDAL; PERINEURAL
Status: DISCONTINUED | OUTPATIENT
Start: 2024-08-12 | End: 2024-08-12

## 2024-08-12 RX ADMIN — Medication 50 MG: at 01:08

## 2024-08-12 RX ADMIN — Medication 100 MG: at 01:08

## 2024-08-12 RX ADMIN — SODIUM CHLORIDE: 9 INJECTION, SOLUTION INTRAVENOUS at 01:08

## 2024-08-12 RX ADMIN — LIDOCAINE HYDROCHLORIDE 50 MG: 20 INJECTION, SOLUTION INTRAVENOUS at 01:08

## 2024-08-12 NOTE — ANESTHESIA PREPROCEDURE EVALUATION
2024  Stephanie Mcarthru is a 49 y.o., female.      Pre-op Assessment    I have reviewed the Patient Summary Reports.     I have reviewed the Nursing Notes. I have reviewed the NPO Status.   I have reviewed the Medications.     Review of Systems  Hepatic/GI:  Bowel Prep.                Neurological:    Neuromuscular Disease,                                 Neuromuscular Disease   Endocrine:        Obesity / BMI > 30      Physical Exam  General: Well nourished, Cooperative, Alert and Oriented    Airway:  Mallampati: II   Mouth Opening: Normal  TM Distance: Normal  Tongue: Normal  Neck ROM: Normal ROM    Dental:  Intact        Anesthesia Plan  Type of Anesthesia, risks & benefits discussed:    Anesthesia Type: Gen Natural Airway, MAC  Intra-op Monitoring Plan: Standard ASA Monitors  Post Op Pain Control Plan: multimodal analgesia  Induction:  IV  Informed Consent: Informed consent signed with the Patient and all parties understand the risks and agree with anesthesia plan.  All questions answered. Patient consented to blood products? Yes  ASA Score: 2  Day of Surgery Review of History & Physical: I have interviewed and examined the patient. I have reviewed the patient's H&P dated: There are no significant changes.     Ready For Surgery From Anesthesia Perspective.     .  Patient Active Problem List   Diagnosis    Calculus of gallbladder without cholecystitis without obstruction    Postop check      Past Medical History:   Diagnosis Date    Bell's palsy     Hx of bell's palsy. pt states during pregnancy        Past Surgical History:   Procedure Laterality Date     SECTION      x4    EYE SURGERY      GALLBLADDER SURGERY  06/10/2024    KNEE SURGERY      left       Family History   Problem Relation Name Age of Onset    Hypertension Mother      Heart disease Father      Breast cancer Paternal Aunt          Social History     Socioeconomic History    Marital status:    Tobacco Use    Smoking status: Never     Passive exposure: Never    Smokeless tobacco: Never   Substance and Sexual Activity    Alcohol use: Never    Drug use: Never    Sexual activity: Not Currently       Current Outpatient Medications   Medication Sig Dispense Refill    acyclovir (ZOVIRAX) 400 MG tablet Take 1 tablet (400 mg total) by mouth 3 (three) times daily. for 10 days 30 tablet 0    cetirizine (ZYRTEC) 10 MG tablet Take 1 tablet (10 mg total) by mouth once daily. 30 tablet 3    CRYSELLE, 28, 0.3-30 mg-mcg per tablet Take 1 tablet by mouth once daily.      ferrous sulfate 325 (65 FE) MG EC tablet Take 1 tablet (325 mg total) by mouth once daily. 30 tablet 3    omeprazole (PRILOSEC) 40 MG capsule Take 1 capsule (40 mg total) by mouth once daily. 30 capsule 2    ondansetron (ZOFRAN-ODT) 4 MG TbDL Take 1 tablet (4 mg total) by mouth every 8 (eight) hours as needed (nausea/vomiting). 20 tablet 0     Current Facility-Administered Medications   Medication Dose Route Frequency Provider Last Rate Last Admin    sodium chloride 0.9% flush 10 mL  10 mL Intravenous PRN Wm Vega MD           Review of patient's allergies indicates:  No Known Allergies

## 2024-08-12 NOTE — H&P
Gastroenterology Pre-procedure H&P    History of Present Illness    Stephanie Mcarthur is a 49 y.o. female that  has a past medical history of Bell's palsy.     Patient with JULIETA, alternating diarrhea/constipation, abdominal pain here for index colonoscopy.     Patient denies anticoagulants, FMHx of GI related malignancies.      Past Medical History:   Diagnosis Date    Bell's palsy     Hx of bell's palsy. pt states during pregnancy        Past Surgical History:   Procedure Laterality Date     SECTION      x4    EYE SURGERY      GALLBLADDER SURGERY  06/10/2024    KNEE SURGERY      left       Family History   Problem Relation Name Age of Onset    Hypertension Mother      Heart disease Father      Breast cancer Paternal Aunt         Social History     Socioeconomic History    Marital status:    Tobacco Use    Smoking status: Never     Passive exposure: Never    Smokeless tobacco: Never   Substance and Sexual Activity    Alcohol use: Never    Drug use: Never    Sexual activity: Not Currently       Current Outpatient Medications   Medication Sig Dispense Refill    acyclovir (ZOVIRAX) 400 MG tablet Take 1 tablet (400 mg total) by mouth 3 (three) times daily. for 10 days 30 tablet 0    cetirizine (ZYRTEC) 10 MG tablet Take 1 tablet (10 mg total) by mouth once daily. 30 tablet 3    CRYSELLE, 28, 0.3-30 mg-mcg per tablet Take 1 tablet by mouth once daily.      ferrous sulfate 325 (65 FE) MG EC tablet Take 1 tablet (325 mg total) by mouth once daily. 30 tablet 3    omeprazole (PRILOSEC) 40 MG capsule Take 1 capsule (40 mg total) by mouth once daily. 30 capsule 2    ondansetron (ZOFRAN-ODT) 4 MG TbDL Take 1 tablet (4 mg total) by mouth every 8 (eight) hours as needed (nausea/vomiting). 20 tablet 0     No current facility-administered medications for this encounter.       Review of patient's allergies indicates:  No Known Allergies    Objective:  There were no vitals filed for this visit.     GEN: normal appearing,  NAD, AAO x3  HENT: NCAT, anicteric, OP benign  CV: normal rate, regular rhythm  RESP: NABS, symmetric rise, unlabored  ABD: soft, ND, no guarding or TTP  SKIN: warm and dry  NEURO: grossly afocal    Assessment and Plan:    Proceed with:    Colonoscopy for iron deficiency anemia, chronic abdominal pain    Wm Vega MD  Gastroenterology

## 2024-08-12 NOTE — DISCHARGE INSTRUCTIONS
Procedure Date  8/12/24     Impression  Overall Impression:   The terminal ileum, ileocecal valve, cecum, ascending colon, transverse colon, descending colon and sigmoid colon appeared normal.  (grade 2) hemorrhoids  Performed pancolonic forceps biopsies to rule out microscopic colitis        Recommendation  Await pathology results   Repeat screening colonoscopy in 10 years for screening purposes   Keep appt for EGD      Outcome of procedure: successful EGD  Disposition: patient to recovery following procedure; discharge to home when appropriate parameters met  Provisions for follow up: please call my office for any unexpected symptoms like chest or abdominal pain or bleeding following your procedure.  Final Diagnosis: JULIETA, abdominal pain      NO DRIVING, OPERATING EQUIPMENT, OR SIGNING LEGAL DOCUMENTS FOR 24 HOURS.  THE NURSE WILL CALL YOU WITH YOUR BIOPSY RESULTS IN A FEW DAYS. IF YOU HAVE  [facility] MYCHART YOUR RESULTS WILL APPEAR THERE AS WELL.  Please call the GI Lab if you have any nausea, vomiting, or abdominal pain.

## 2024-08-12 NOTE — TRANSFER OF CARE
"Anesthesia Transfer of Care Note    Patient: Stephanie Mcarthur    Procedure(s) Performed: * No procedures listed *    Patient location: GI    Anesthesia Type: MAC    Transport from OR: Transported from OR on room air with adequate spontaneous ventilation    Post pain: adequate analgesia    Post assessment: no apparent anesthetic complications    Post vital signs: stable    Level of consciousness: responds to stimulation    Nausea/Vomiting: no nausea/vomiting    Complications: none    Transfer of care protocol was followed      Last vitals: Visit Vitals  /86   Pulse 78   Temp 36.8 °C (98.2 °F) (Oral)   Resp 20   Ht 5' 5" (1.651 m)   Wt 95.3 kg (210 lb)   SpO2 98%   Breastfeeding No   BMI 34.95 kg/m²     "

## 2024-08-13 LAB
ESTROGEN SERPL-MCNC: NORMAL PG/ML
INSULIN SERPL-ACNC: NORMAL U[IU]/ML
LAB AP GROSS DESCRIPTION: NORMAL
LAB AP LABORATORY NOTES: NORMAL
T3RU NFR SERPL: NORMAL %

## 2024-08-19 ENCOUNTER — ANESTHESIA (OUTPATIENT)
Dept: GASTROENTEROLOGY | Facility: HOSPITAL | Age: 49
End: 2024-08-19
Payer: COMMERCIAL

## 2024-08-19 ENCOUNTER — HOSPITAL ENCOUNTER (OUTPATIENT)
Dept: GASTROENTEROLOGY | Facility: HOSPITAL | Age: 49
Discharge: HOME | End: 2024-08-19
Admitting: INTERNAL MEDICINE
Payer: COMMERCIAL

## 2024-08-19 ENCOUNTER — ANESTHESIA EVENT (OUTPATIENT)
Dept: GASTROENTEROLOGY | Facility: HOSPITAL | Age: 49
End: 2024-08-19
Payer: COMMERCIAL

## 2024-08-19 VITALS
WEIGHT: 215 LBS | TEMPERATURE: 98 F | SYSTOLIC BLOOD PRESSURE: 139 MMHG | HEART RATE: 68 BPM | BODY MASS INDEX: 35.82 KG/M2 | DIASTOLIC BLOOD PRESSURE: 90 MMHG | OXYGEN SATURATION: 97 % | HEIGHT: 65 IN | RESPIRATION RATE: 20 BRPM

## 2024-08-19 DIAGNOSIS — R11.0 NAUSEA: ICD-10-CM

## 2024-08-19 DIAGNOSIS — R10.13 EPIGASTRIC PAIN: ICD-10-CM

## 2024-08-19 DIAGNOSIS — R13.10 DYSPHAGIA, UNSPECIFIED TYPE: ICD-10-CM

## 2024-08-19 LAB
B-HCG UR QL: NEGATIVE
CTP QC/QA: YES

## 2024-08-19 PROCEDURE — 25000003 PHARM REV CODE 250

## 2024-08-19 PROCEDURE — 81025 URINE PREGNANCY TEST: CPT | Performed by: INTERNAL MEDICINE

## 2024-08-19 PROCEDURE — 88305 TISSUE EXAM BY PATHOLOGIST: CPT | Mod: TC,91,SUR | Performed by: INTERNAL MEDICINE

## 2024-08-19 PROCEDURE — 88305 TISSUE EXAM BY PATHOLOGIST: CPT | Mod: 26,,, | Performed by: PATHOLOGY

## 2024-08-19 PROCEDURE — 27201423 OPTIME MED/SURG SUP & DEVICES STERILE SUPPLY

## 2024-08-19 PROCEDURE — 37000009 HC ANESTHESIA EA ADD 15 MINS

## 2024-08-19 PROCEDURE — 43248 EGD GUIDE WIRE INSERTION: CPT | Mod: ,,, | Performed by: INTERNAL MEDICINE

## 2024-08-19 PROCEDURE — 43239 EGD BIOPSY SINGLE/MULTIPLE: CPT | Mod: 59 | Performed by: INTERNAL MEDICINE

## 2024-08-19 PROCEDURE — 43248 EGD GUIDE WIRE INSERTION: CPT | Performed by: INTERNAL MEDICINE

## 2024-08-19 PROCEDURE — 43239 EGD BIOPSY SINGLE/MULTIPLE: CPT | Mod: 59,,, | Performed by: INTERNAL MEDICINE

## 2024-08-19 PROCEDURE — 37000008 HC ANESTHESIA 1ST 15 MINUTES

## 2024-08-19 RX ORDER — LIDOCAINE HYDROCHLORIDE 20 MG/ML
INJECTION, SOLUTION EPIDURAL; INFILTRATION; INTRACAUDAL; PERINEURAL
Status: DISCONTINUED | OUTPATIENT
Start: 2024-08-19 | End: 2024-08-19

## 2024-08-19 RX ORDER — SODIUM CHLORIDE 9 MG/ML
INJECTION, SOLUTION INTRAVENOUS CONTINUOUS PRN
Status: DISCONTINUED | OUTPATIENT
Start: 2024-08-19 | End: 2024-08-19

## 2024-08-19 RX ORDER — SODIUM CHLORIDE 0.9 % (FLUSH) 0.9 %
10 SYRINGE (ML) INJECTION
Status: DISCONTINUED | OUTPATIENT
Start: 2024-08-19 | End: 2024-08-20 | Stop reason: HOSPADM

## 2024-08-19 RX ORDER — PROPOFOL 10 MG/ML
VIAL (ML) INTRAVENOUS
Status: DISCONTINUED | OUTPATIENT
Start: 2024-08-19 | End: 2024-08-19

## 2024-08-19 RX ADMIN — LIDOCAINE HYDROCHLORIDE 100 MG: 20 INJECTION, SOLUTION INTRAVENOUS at 10:08

## 2024-08-19 RX ADMIN — Medication 40 MG: at 10:08

## 2024-08-19 RX ADMIN — SODIUM CHLORIDE: 9 INJECTION, SOLUTION INTRAVENOUS at 10:08

## 2024-08-19 RX ADMIN — Medication 150 MG: at 10:08

## 2024-08-19 NOTE — DISCHARGE INSTRUCTIONS
Procedure Date  8/19/24     Impression  Overall Impression:   Irregular Z-line; performed cold forceps biopsy  The upper third of the esophagus, middle third of the esophagus, lower third of the esophagus, cardia, fundus of the stomach, body of the stomach, incisura, antrum, duodenal bulb, 1st part of the duodenum and 2nd part of the duodenum appeared normal. Performed random biopsy to rule out celiac disease and eosinophilic esophagitis.  Dilated in the esophagus with Savursula-Vadim dilator to 51 Fr ending size. Dilation caused improved passage of the scope        Recommendation  Await pathology results  Continue prilosec    Follow up in GI clinic as scheduled or sooner PRN       Outcome of procedure: successful EGD  Disposition: patient to recovery following procedure; discharge to home when appropriate parameters met  Provisions for follow up: please call my office for any unexpected symptoms like chest or abdominal pain or bleeding following your procedure.  Final Diagnosis: dysphagia, GERD    NO DRIVING, OPERATING EQUIPMENT, OR SIGNING LEGAL DOCUMENTS FOR 24 HOURS.

## 2024-08-19 NOTE — ANESTHESIA POSTPROCEDURE EVALUATION
Anesthesia Post Evaluation    Patient: Stephanie Mcarthur    Procedure(s) Performed: * colonoscopy *    Final Anesthesia Type: MAC      Patient location during evaluation: GI PACU  Patient participation: Yes- Able to Participate  Level of consciousness: awake and alert and oriented  Post-procedure vital signs: reviewed and stable  Pain management: adequate  Airway patency: patent    PONV status at discharge: No PONV  Anesthetic complications: no      Cardiovascular status: blood pressure returned to baseline  Respiratory status: unassisted, spontaneous ventilation and room air  Hydration status: euvolemic  Follow-up not needed.  Comments: See nursing note for post op pain/kush score              Vitals Value Taken Time   /96 08/12/24 1400   Temp 36.7 °C (98 °F) 08/12/24 1400   Pulse 70 08/12/24 1400   Resp 13 08/12/24 1400   SpO2 97 % 08/12/24 1400         Event Time   Out of Recovery 14:00:31         Pain/Kush Score: No data recorded

## 2024-08-19 NOTE — TRANSFER OF CARE
"Anesthesia Transfer of Care Note    Patient: Stephanie Mcarthur    Procedure(s) Performed: EGD    Patient location: GI    Anesthesia Type: general and MAC    Transport from OR: Transported from OR on room air with adequate spontaneous ventilation    Post pain: adequate analgesia    Post assessment: no apparent anesthetic complications    Post vital signs: stable    Level of consciousness: awake, alert and oriented    Nausea/Vomiting: no nausea/vomiting    Complications: none    Transfer of care protocol was followedComments: Refer to nursing note for pain kush score upon discharge from recovery      Last vitals: Visit Vitals  /88   Pulse 87   Temp 36.6 °C (97.9 °F)   Resp 10   Ht 5' 5" (1.651 m)   Wt 97.5 kg (215 lb)   SpO2 96%   Breastfeeding No   BMI 35.78 kg/m²     "

## 2024-08-19 NOTE — ANESTHESIA POSTPROCEDURE EVALUATION
Anesthesia Post Evaluation    Patient: Stephanie Mcarthur    Procedure(s) Performed: EGD    Final Anesthesia Type: MAC      Patient location during evaluation: GI PACU  Patient participation: Yes- Able to Participate  Level of consciousness: awake and alert  Post-procedure vital signs: reviewed and stable  Pain management: adequate  Airway patency: patent    PONV status at discharge: No PONV  Anesthetic complications: no      Cardiovascular status: blood pressure returned to baseline  Respiratory status: unassisted and spontaneous ventilation  Hydration status: euvolemic  Follow-up not needed.  Comments: Refer to nursing note for pain and kush score upon discharge from recovery              Vitals Value Taken Time   /90 08/19/24 1105   Temp 36.6 °C (97.9 °F) 08/19/24 1031   Pulse 68 08/19/24 1105   Resp 20 08/19/24 1105   SpO2 97 % 08/19/24 1105         Event Time   Out of Recovery 11:16:39         Pain/Kush Score: Kush Score: 10 (8/19/2024 10:46 AM)

## 2024-08-19 NOTE — ANESTHESIA PREPROCEDURE EVALUATION
08/19/2024  Stephanie Mcarthur is a 49 y.o., female.      Pre-op Assessment    I have reviewed the Patient Summary Reports.     I have reviewed the Nursing Notes. I have reviewed the NPO Status.   I have reviewed the Medications.     Review of Systems  Anesthesia Hx:   History of prior surgery of interest to airway management or planning:          Denies Family Hx of Anesthesia complications.    Denies Personal Hx of Anesthesia complications.                    Social:  Non-Smoker, No Alcohol Use       Neurological:    Neuromuscular Disease,                                 Neuromuscular Disease   Endocrine:        Obesity / BMI > 30    Active Ambulatory Problems     Diagnosis Date Noted    Calculus of gallbladder without cholecystitis without obstruction 05/21/2024    Postop check 06/24/2024    Change in bowel habits 08/12/2024    Generalized abdominal pain 08/12/2024     Resolved Ambulatory Problems     Diagnosis Date Noted    No Resolved Ambulatory Problems     Past Medical History:   Diagnosis Date    Bell's palsy       Current Outpatient Medications on File Prior to Visit   Medication Sig Dispense Refill    acyclovir (ZOVIRAX) 400 MG tablet Take 1 tablet (400 mg total) by mouth 3 (three) times daily. for 10 days 30 tablet 0    cetirizine (ZYRTEC) 10 MG tablet Take 1 tablet (10 mg total) by mouth once daily. 30 tablet 3    CRYSELLE, 28, 0.3-30 mg-mcg per tablet Take 1 tablet by mouth once daily.      ferrous sulfate 325 (65 FE) MG EC tablet Take 1 tablet (325 mg total) by mouth once daily. 30 tablet 3    omeprazole (PRILOSEC) 40 MG capsule Take 1 capsule (40 mg total) by mouth once daily. 30 capsule 2    ondansetron (ZOFRAN-ODT) 4 MG TbDL Take 1 tablet (4 mg total) by mouth every 8 (eight) hours as needed (nausea/vomiting). 20 tablet 0     No current facility-administered medications on file prior to visit.       Past Surgical History:   Procedure Laterality Date     SECTION      x4    EYE SURGERY      GALLBLADDER SURGERY  06/10/2024    KNEE SURGERY      left    Review of patient's allergies indicates:  No Known Allergies     .  Physical Exam  General: Well nourished    Airway:  Mallampati: II   Mouth Opening: Normal  TM Distance: Normal, at least 6 cm  Tongue: Normal  Neck ROM: Normal ROM        Anesthesia Plan  Type of Anesthesia, risks & benefits discussed:    Anesthesia Type: MAC  Intra-op Monitoring Plan: Standard ASA Monitors  Post Op Pain Control Plan: multimodal analgesia  Induction:  IV  Informed Consent: Informed consent signed with the Patient and all parties understand the risks and agree with anesthesia plan.  All questions answered. Patient consented to blood products? No  ASA Score: 2  Day of Surgery Review of History & Physical: H&P Update referred to the surgeon/provider.I have interviewed and examined the patient. I have reviewed the patient's H&P dated: There are no significant changes.     Ready For Surgery From Anesthesia Perspective.     .

## 2024-08-19 NOTE — H&P
"Gastroenterology Pre-procedure H&P    History of Present Illness    Stephanie Mcarthur is a 49 y.o. female that  has a past medical history of Bell's palsy.     Patient with dysphagia, intermittent diarrhea, abdominal pain here for EGD      Past Medical History:   Diagnosis Date    Bell's palsy     Hx of bell's palsy. pt states during pregnancy        Past Surgical History:   Procedure Laterality Date     SECTION      x4    EYE SURGERY      GALLBLADDER SURGERY  06/10/2024    KNEE SURGERY      left       Family History   Problem Relation Name Age of Onset    Hypertension Mother      Heart disease Father      Breast cancer Paternal Aunt         Social History     Socioeconomic History    Marital status:    Tobacco Use    Smoking status: Never     Passive exposure: Never    Smokeless tobacco: Never   Substance and Sexual Activity    Alcohol use: Never    Drug use: Never    Sexual activity: Not Currently       Current Outpatient Medications   Medication Sig Dispense Refill    acyclovir (ZOVIRAX) 400 MG tablet Take 1 tablet (400 mg total) by mouth 3 (three) times daily. for 10 days 30 tablet 0    cetirizine (ZYRTEC) 10 MG tablet Take 1 tablet (10 mg total) by mouth once daily. 30 tablet 3    CRYSELLE, 28, 0.3-30 mg-mcg per tablet Take 1 tablet by mouth once daily.      ferrous sulfate 325 (65 FE) MG EC tablet Take 1 tablet (325 mg total) by mouth once daily. 30 tablet 3    omeprazole (PRILOSEC) 40 MG capsule Take 1 capsule (40 mg total) by mouth once daily. 30 capsule 2    ondansetron (ZOFRAN-ODT) 4 MG TbDL Take 1 tablet (4 mg total) by mouth every 8 (eight) hours as needed (nausea/vomiting). 20 tablet 0     No current facility-administered medications for this encounter.       Review of patient's allergies indicates:  No Known Allergies    Objective:  Vitals:    24 0956   BP: (!) 139/96   Pulse: 70   Resp: 13   Temp: 98 °F (36.7 °C)   TempSrc: Oral   SpO2: 97%   Weight: 97.5 kg (215 lb)   Height: 5' 5" " (1.651 m)        GEN: normal appearing, NAD, AAO x3  HENT: NCAT, anicteric, OP benign  CV: normal rate, regular rhythm  RESP: CTA, symmetric rise, unlabored  ABD: soft, ND, no guarding or TTP  SKIN: warm and dry  NEURO: grossly afocal    Assessment and Plan:    Proceed with:    EGD for dysphagia, diarrhea       Wm Vega MD  Gastroenterology

## 2024-09-23 PROBLEM — Z09 POSTOP CHECK: Status: RESOLVED | Noted: 2024-06-24 | Resolved: 2024-09-23

## 2024-09-30 ENCOUNTER — HOSPITAL ENCOUNTER (OUTPATIENT)
Dept: RADIOLOGY | Facility: HOSPITAL | Age: 49
Discharge: HOME | End: 2024-09-30
Attending: NURSE PRACTITIONER
Payer: COMMERCIAL

## 2024-09-30 ENCOUNTER — OFFICE VISIT (OUTPATIENT)
Dept: FAMILY MEDICINE | Facility: CLINIC | Age: 49
End: 2024-09-30
Payer: COMMERCIAL

## 2024-09-30 ENCOUNTER — PATIENT MESSAGE (OUTPATIENT)
Dept: FAMILY MEDICINE | Facility: CLINIC | Age: 49
End: 2024-09-30
Payer: COMMERCIAL

## 2024-09-30 VITALS
SYSTOLIC BLOOD PRESSURE: 118 MMHG | HEART RATE: 78 BPM | RESPIRATION RATE: 18 BRPM | OXYGEN SATURATION: 98 % | DIASTOLIC BLOOD PRESSURE: 72 MMHG | TEMPERATURE: 97 F | WEIGHT: 233 LBS | BODY MASS INDEX: 38.82 KG/M2 | HEIGHT: 65 IN

## 2024-09-30 DIAGNOSIS — R09.81 NASAL CONGESTION: Primary | ICD-10-CM

## 2024-09-30 DIAGNOSIS — R09.81 NASAL CONGESTION: ICD-10-CM

## 2024-09-30 DIAGNOSIS — J01.90 ACUTE SINUSITIS, RECURRENCE NOT SPECIFIED, UNSPECIFIED LOCATION: ICD-10-CM

## 2024-09-30 PROCEDURE — 3044F HG A1C LEVEL LT 7.0%: CPT | Mod: CPTII,,, | Performed by: NURSE PRACTITIONER

## 2024-09-30 PROCEDURE — 3008F BODY MASS INDEX DOCD: CPT | Mod: CPTII,,, | Performed by: NURSE PRACTITIONER

## 2024-09-30 PROCEDURE — 1159F MED LIST DOCD IN RCRD: CPT | Mod: CPTII,,, | Performed by: NURSE PRACTITIONER

## 2024-09-30 PROCEDURE — 3074F SYST BP LT 130 MM HG: CPT | Mod: CPTII,,, | Performed by: NURSE PRACTITIONER

## 2024-09-30 PROCEDURE — 70220 X-RAY EXAM OF SINUSES: CPT | Mod: TC

## 2024-09-30 PROCEDURE — 3078F DIAST BP <80 MM HG: CPT | Mod: CPTII,,, | Performed by: NURSE PRACTITIONER

## 2024-09-30 PROCEDURE — 1160F RVW MEDS BY RX/DR IN RCRD: CPT | Mod: CPTII,,, | Performed by: NURSE PRACTITIONER

## 2024-09-30 PROCEDURE — 99214 OFFICE O/P EST MOD 30 MIN: CPT | Mod: 25,,, | Performed by: NURSE PRACTITIONER

## 2024-09-30 PROCEDURE — 96372 THER/PROPH/DIAG INJ SC/IM: CPT | Mod: ,,, | Performed by: NURSE PRACTITIONER

## 2024-09-30 PROCEDURE — 70220 X-RAY EXAM OF SINUSES: CPT | Mod: 26,,, | Performed by: RADIOLOGY

## 2024-09-30 RX ORDER — CETIRIZINE HYDROCHLORIDE, PSEUDOEPHEDRINE HYDROCHLORIDE 5; 120 MG/1; MG/1
1 TABLET, FILM COATED, EXTENDED RELEASE ORAL EVERY 12 HOURS PRN
Qty: 24 TABLET | Refills: 1 | Status: SHIPPED | OUTPATIENT
Start: 2024-09-30 | End: 2024-10-10

## 2024-09-30 RX ORDER — CEFDINIR 300 MG/1
300 CAPSULE ORAL 2 TIMES DAILY
Qty: 20 CAPSULE | Refills: 0 | Status: SHIPPED | OUTPATIENT
Start: 2024-09-30 | End: 2024-10-10

## 2024-09-30 RX ORDER — FLUTICASONE PROPIONATE 50 MCG
2 SPRAY, SUSPENSION (ML) NASAL DAILY
Qty: 16 G | Refills: 5 | Status: SHIPPED | OUTPATIENT
Start: 2024-09-30

## 2024-09-30 RX ORDER — DEXAMETHASONE SODIUM PHOSPHATE 4 MG/ML
6 INJECTION, SOLUTION INTRA-ARTICULAR; INTRALESIONAL; INTRAMUSCULAR; INTRAVENOUS; SOFT TISSUE
Status: COMPLETED | OUTPATIENT
Start: 2024-09-30 | End: 2024-09-30

## 2024-09-30 RX ORDER — LANOLIN ALCOHOL/MO/W.PET/CERES
100 CREAM (GRAM) TOPICAL DAILY
COMMUNITY

## 2024-09-30 RX ADMIN — DEXAMETHASONE SODIUM PHOSPHATE 6 MG: 4 INJECTION, SOLUTION INTRA-ARTICULAR; INTRALESIONAL; INTRAMUSCULAR; INTRAVENOUS; SOFT TISSUE at 02:09

## 2024-09-30 NOTE — LETTER
September 30, 2024    Stephanie Mcarthur  [address]  [place] [address]             [facility]  Family Medicine  [address]  [place] [address]  Phone: [phone]   September 30, 2024     Patient: Stephanie Mcarthur   YOB: 1975   Date of Visit: 9/30/2024       To Whom it May Concern:    Stephanie Mcarthur was seen in my clinic on 9/30/2024. She may return to work on 09/30/2024 .    Please excuse her from any classes or work missed.    If you have any questions or concerns, please don't hesitate to call.    Sincerely,         Dona Ling, ANGELAP

## 2024-09-30 NOTE — PROGRESS NOTES
Subjective     Patient ID: Stephanie Mcarthur is a 49 y.o. female.    Chief Complaint: Otalgia (Bilateral ear fullness )    Otalgia   There is pain in both ears. This is a new problem. The current episode started in the past 7 days. The problem occurs constantly. The problem has been gradually worsening. There has been no fever. Associated symptoms include headaches, rhinorrhea and a sore throat. Pertinent negatives include no abdominal pain, coughing, diarrhea, ear discharge, hearing loss, neck pain, rash or vomiting. She has tried nothing for the symptoms.     Review of Systems   HENT:  Positive for nasal congestion, ear pain, postnasal drip, rhinorrhea, sinus pressure/congestion and sore throat. Negative for ear discharge, hearing loss and nosebleeds.    Respiratory:  Negative for cough.    Gastrointestinal:  Negative for abdominal pain, diarrhea and vomiting.   Genitourinary:  Negative for dysuria and menstrual irregularity.   Musculoskeletal:  Negative for neck pain.   Integumentary:  Negative for rash.   Neurological:  Positive for headaches.          Objective     Physical Exam  Vitals and nursing note reviewed.   Constitutional:       Appearance: Normal appearance. She is not ill-appearing.   HENT:      Head: Normocephalic.      Ears:      Comments: Scarring present left TM; bilateral Tms dull in appearance     Nose: Congestion and rhinorrhea present.      Mouth/Throat:      Mouth: Mucous membranes are moist.      Pharynx: Posterior oropharyngeal erythema present. No oropharyngeal exudate.   Eyes:      General: No scleral icterus.     Pupils: Pupils are equal, round, and reactive to light.   Cardiovascular:      Rate and Rhythm: Normal rate and regular rhythm.   Pulmonary:      Effort: Pulmonary effort is normal.      Breath sounds: Normal breath sounds.   Abdominal:      General: Bowel sounds are normal.      Palpations: Abdomen is soft.   Musculoskeletal:      Cervical back: Normal range of motion.      Right  lower leg: No edema.      Left lower leg: No edema.   Skin:     General: Skin is warm and dry.      Capillary Refill: Capillary refill takes 2 to 3 seconds.   Neurological:      General: No focal deficit present.      Mental Status: She is alert and oriented to person, place, and time. Mental status is at baseline.      Gait: Gait normal.   Psychiatric:         Mood and Affect: Mood normal.         Behavior: Behavior normal.         Thought Content: Thought content normal.         Judgment: Judgment normal.            Assessment and Plan     1. Nasal congestion  -     fluticasone propionate (FLONASE) 50 mcg/actuation nasal spray; 2 sprays (100 mcg total) by Each Nostril route once daily.  Dispense: 16 g; Refill: 5  -     dexAMETHasone injection 6 mg  -     cetirizine-pseudoephedrine 5-120 mg Tb12; Take 1 tablet by mouth every 12 (twelve) hours as needed.  Dispense: 24 tablet; Refill: 1  -     X-Ray Sinuses Min 3 Views; Future; Expected date: 09/30/2024    2. Acute sinusitis, recurrence not specified, unspecified location  -     dexAMETHasone injection 6 mg  -     cefdinir (OMNICEF) 300 MG capsule; Take 1 capsule (300 mg total) by mouth 2 (two) times daily. for 10 days  Dispense: 20 capsule; Refill: 0      Return to clinic for new or worsening symptoms.  Patient reportedly has history of recurrent otitis media, warranting ENT referral in the past.  If she fails to resolve, she is to call clinic and we will refer to ENT at that time.  I am also going to get an x-ray of her sinuses to see if there is underlying thickening/mucus/fluid for baseline purposes.          No follow-ups on file.

## 2024-10-11 ENCOUNTER — OFFICE VISIT (OUTPATIENT)
Dept: GASTROENTEROLOGY | Facility: CLINIC | Age: 49
End: 2024-10-11
Payer: COMMERCIAL

## 2024-10-11 VITALS
HEART RATE: 86 BPM | SYSTOLIC BLOOD PRESSURE: 137 MMHG | BODY MASS INDEX: 36.82 KG/M2 | OXYGEN SATURATION: 96 % | DIASTOLIC BLOOD PRESSURE: 91 MMHG | WEIGHT: 221 LBS | HEIGHT: 65 IN

## 2024-10-11 DIAGNOSIS — K76.0 FATTY LIVER: ICD-10-CM

## 2024-10-11 DIAGNOSIS — K58.0 IRRITABLE BOWEL SYNDROME WITH DIARRHEA: Primary | ICD-10-CM

## 2024-10-11 PROCEDURE — 99999 PR PBB SHADOW E&M-EST. PATIENT-LVL IV: CPT | Mod: PBBFAC,,,

## 2024-10-11 PROCEDURE — 99214 OFFICE O/P EST MOD 30 MIN: CPT | Mod: PBBFAC

## 2024-10-11 PROCEDURE — 99214 OFFICE O/P EST MOD 30 MIN: CPT | Mod: S$PBB,,,

## 2024-10-11 PROCEDURE — 3008F BODY MASS INDEX DOCD: CPT | Mod: CPTII,,,

## 2024-10-11 PROCEDURE — 3044F HG A1C LEVEL LT 7.0%: CPT | Mod: CPTII,,,

## 2024-10-11 PROCEDURE — 1159F MED LIST DOCD IN RCRD: CPT | Mod: CPTII,,,

## 2024-10-11 PROCEDURE — 3075F SYST BP GE 130 - 139MM HG: CPT | Mod: CPTII,,,

## 2024-10-11 PROCEDURE — 3080F DIAST BP >= 90 MM HG: CPT | Mod: CPTII,,,

## 2024-10-11 PROCEDURE — 1160F RVW MEDS BY RX/DR IN RCRD: CPT | Mod: CPTII,,,

## 2024-10-11 NOTE — PATIENT INSTRUCTIONS
- I recommend starting a daily fiber supplement with Citrucel or Fibercon (can purchase at your local pharmacy)  - Take all of your medicines as instructed.  - Avoid alcohol. Alcohol can make liver problems worse.  - Eat a healthy diet with plenty of vegetables, fruits, and whole grains.  - Get regular physical activity. Even gentle activity, like walking, is good for your health.  - Treat your high blood sugar if you have diabetes  - Treat your high cholesterol if you have it

## 2024-10-11 NOTE — PROGRESS NOTES
Gastroenterology Clinic Note    Patient ID: [id number]   Referring MD: No ref. provider found   Chief Complaint:   Chief Complaint   Patient presents with    Follow-up    Diarrhea     After eating       History of Present Illness   Stephanie Mcarthur is an 49 y.o. WF who is referred for abdominal pain. She reports epigastric and LUQ abdominal pain. She will occasionally have lower abdominal pain as well. She complains of intermittent nausea and will alternate diarrhea and formed stool. She denies any hematochezia or melena. She does experienced intermittent dysphagia with certain solid foods such as bread. She was started on Prilosec 40 mg daily within the past week. She denies any prior endoscopy. No FMH of CRC.    Previous workup:  EGD; abdominal ultrasound    Last colonoscopy was 2024 with 10 year recall for screening purposes.    Interval  - abdominal ultrasound revealed a 7 mm gallstone suggest fatty liver; s/p cholecystectomy; abdominal pain has improved  - EGD with dilation improved dysphagia  - she continues to experience 3-4 episodes of diarrhea per day; urgency with meals; occasional abdominal discomfort; no hematochezia or melena    Review of Systems   Constitutional:  Negative for weight loss.   Gastrointestinal:  Positive for diarrhea. Negative for abdominal pain, blood in stool, constipation, melena, nausea and vomiting.       Past Medical History      Past Medical History:   Diagnosis Date    Bell's palsy     Hx of bell's palsy. pt states during pregnancy     Hormone replacement therapy        Past Surgical History     Past Surgical History:   Procedure Laterality Date     SECTION      x4    EYE SURGERY      GALLBLADDER SURGERY  06/10/2024    KNEE SURGERY      left       Allergies   Review of patient's allergies indicates:  No Known Allergies    Immunization History     There is no immunization history on file for this patient.    Past Family History      Family History   Problem Relation Name  "Age of Onset    Hypertension Mother      Heart disease Father      Breast cancer Paternal Aunt         Past Social History      Social History     Socioeconomic History    Marital status:    Tobacco Use    Smoking status: Never     Passive exposure: Never    Smokeless tobacco: Never   Substance and Sexual Activity    Alcohol use: Never    Drug use: Never    Sexual activity: Not Currently       Current Medications     Outpatient Medications Marked as Taking for the 10/11/24 encounter (Office Visit) with Shauna Fallon FNP   Medication Sig Dispense Refill    calcium-vitamin D 250 mg-2.5 mcg (100 unit) per tablet Take 1 tablet by mouth 2 (two) times daily.      cyanocobalamin (VITAMIN B-12) 1000 MCG tablet Take 100 mcg by mouth once daily.      ferrous sulfate 325 (65 FE) MG EC tablet Take 1 tablet (325 mg total) by mouth once daily. 30 tablet 3    omeprazole (PRILOSEC) 40 MG capsule Take 1 capsule (40 mg total) by mouth once daily. 30 capsule 2        I have reviewed the current medications, allergies, vital signs, past medical and surgical history, family medical history, and social history for this encounter and agree with all findings.    OBJECTIVE    Physical Exam    BP (!) 137/91   Pulse 86   Ht 5' 5" (1.651 m)   Wt 100.2 kg (221 lb)   SpO2 96%   BMI 36.78 kg/m²   GEN: Well appearing, cooperative, NAD  NECK: Supple, no LAD  CV: Normal rate  RESP: Unlabored  ABD: ND, no guarding  EXT: No clubbing, cyanosis, or edema  SKIN: Warm and dry  NEURO: AAO x4.     LABS    CBC (with or without Differential):   Lab Results   Component Value Date    WBC 7.41 05/21/2024    HGB 11.6 (L) 05/21/2024    HCT 39.7 05/21/2024    MCV 71.0 (L) 05/21/2024    MCH 20.8 (L) 05/21/2024    MCHC 29.2 (L) 05/21/2024    RDW 19.2 (H) 05/21/2024     05/21/2024    MPV 9.6 05/21/2024    NEUTOPHILPCT 50.1 (L) 05/21/2024    DIFFTYPE Scan Smear 05/21/2024     BMP/CMP:   Lab Results   Component Value Date     05/21/2024    K " 4.2 05/21/2024     (H) 05/21/2024    CO2 28 05/21/2024    BUN 11 05/21/2024    CREATININE 0.76 05/21/2024     05/21/2024    CALCIUM 8.9 05/21/2024    ALBUMIN 3.5 05/21/2024    AST 26 05/21/2024    ALT 28 05/21/2024    ALKPHOS 108 (H) 05/21/2024        IMAGING  Ultrasound abdomen complete 05/2024  - Single 7 mm gallstone visualized. No abnormal gallbladder wall thickening or distention. Increased echogenicity of the liver suggestive of steatosis.     Xray KUB  - Nonspecific bowel gas pattern     ASSESSMENT  Stephanie Mcarthur is a 49 y.o. WF without significant medical history who is referred for abdominal pain.     1. Irritable bowel syndrome with diarrhea    2. Fatty liver           PLAN    - low FODMAP diet  - daily fiber supplement for IBS-D; consider trial of Xifaxan for Colestid at follow-up if no improvement  - annual surveillance of fatty liver with ultrasound and labs  Patient Instructions   - I recommend starting a daily fiber supplement with Citrucel or Fibercon (can purchase at your local pharmacy)  - Take all of your medicines as instructed.  - Avoid alcohol. Alcohol can make liver problems worse.  - Eat a healthy diet with plenty of vegetables, fruits, and whole grains.  - Get regular physical activity. Even gentle activity, like walking, is good for your health.  - Treat your high blood sugar if you have diabetes  - Treat your high cholesterol if you have it         Orders Placed This Encounter   Procedures    CBC Auto Differential     Standing Status:   Future     Standing Expiration Date:   12/11/2025    Comprehensive Metabolic Panel     Standing Status:   Future     Standing Expiration Date:   12/11/2025         The risks and benefits of my recommendations, as well as other treatment options were discussed with the patient today. All questions were answered.    35 minutes of total time spent on the encounter, which includes face to face time and non-face to face time preparing to see the  patient (eg, review of tests), obtaining and/or reviewing separately obtained history, documenting clinical information in the electronic or other health record, Independently interpreting results (not separately reported) and communicating results to the patient/family/caregiver, or care coordination (not separately reported).        Shauna Fallon, FNP/ACNP  [facility]

## 2024-11-11 ENCOUNTER — PATIENT MESSAGE (OUTPATIENT)
Dept: GASTROENTEROLOGY | Facility: CLINIC | Age: 49
End: 2024-11-11
Payer: COMMERCIAL

## 2024-11-19 ENCOUNTER — PATIENT MESSAGE (OUTPATIENT)
Dept: FAMILY MEDICINE | Facility: CLINIC | Age: 49
End: 2024-11-19
Payer: COMMERCIAL

## 2024-12-20 ENCOUNTER — TELEPHONE (OUTPATIENT)
Dept: GASTROENTEROLOGY | Facility: CLINIC | Age: 49
End: 2024-12-20
Payer: COMMERCIAL

## 2024-12-20 DIAGNOSIS — D50.9 IRON DEFICIENCY ANEMIA, UNSPECIFIED IRON DEFICIENCY ANEMIA TYPE: ICD-10-CM

## 2024-12-20 RX ORDER — FERROUS SULFATE 325(65) MG
325 TABLET, DELAYED RELEASE (ENTERIC COATED) ORAL DAILY
Qty: 30 TABLET | Refills: 3 | Status: SHIPPED | OUTPATIENT
Start: 2024-12-20

## 2024-12-20 NOTE — TELEPHONE ENCOUNTER
----- Message from YVETTE Moraes sent at 12/20/2024 12:10 PM CST -----  Regarding: FW: Medication Refill  I sent refill and ordered labs for recheck at her convenience.  ----- Message -----  From: Justo Almeida MA  Sent: 12/19/2024   1:43 PM CST  To: YVETTE Moraes  Subject: Medication Refill                                Pt left a V/M stating she needed to know if you wanted her to continue with the iron pills. If so she is going to need a refill. [phone]

## 2025-03-28 ENCOUNTER — LAB VISIT (OUTPATIENT)
Dept: LAB | Facility: CLINIC | Age: 50
End: 2025-03-28
Payer: COMMERCIAL

## 2025-03-28 DIAGNOSIS — Z79.890 HORMONE REPLACEMENT THERAPY, POSTMENOPAUSAL: ICD-10-CM

## 2025-03-28 DIAGNOSIS — E34.9 HORMONE DISTURBANCE: ICD-10-CM

## 2025-03-28 DIAGNOSIS — N95.1 MENOPAUSAL SYMPTOMS: Primary | ICD-10-CM

## 2025-03-28 DIAGNOSIS — E55.9 VITAMIN D DEFICIENCY: ICD-10-CM

## 2025-03-28 LAB
25(OH)D3 SERPL-MCNC: 27.9 NG/ML (ref 30–80)
ESTRADIOL SERPL-MCNC: <24 PG/ML
SHBG SERPL-SCNC: 19.1 NMOL/L (ref 30.4–137.2)

## 2025-03-28 PROCEDURE — 82670 ASSAY OF TOTAL ESTRADIOL: CPT | Mod: ,,, | Performed by: CLINICAL MEDICAL LABORATORY

## 2025-03-28 PROCEDURE — 82306 VITAMIN D 25 HYDROXY: CPT | Mod: ,,, | Performed by: CLINICAL MEDICAL LABORATORY

## 2025-03-28 PROCEDURE — 84270 ASSAY OF SEX HORMONE GLOBUL: CPT | Mod: ,,, | Performed by: CLINICAL MEDICAL LABORATORY

## 2025-03-28 PROCEDURE — 36415 COLL VENOUS BLD VENIPUNCTURE: CPT | Mod: ,,, | Performed by: CLINICAL MEDICAL LABORATORY

## 2025-03-28 PROCEDURE — 84403 ASSAY OF TOTAL TESTOSTERONE: CPT | Mod: ,,, | Performed by: CLINICAL MEDICAL LABORATORY

## 2025-03-30 LAB — TESTOST SERPL-MCNC: 139.9 NG/DL (ref 13.8–15.4)

## 2025-04-11 ENCOUNTER — OFFICE VISIT (OUTPATIENT)
Dept: GASTROENTEROLOGY | Facility: CLINIC | Age: 50
End: 2025-04-11
Payer: COMMERCIAL

## 2025-04-11 VITALS
OXYGEN SATURATION: 95 % | WEIGHT: 223.81 LBS | HEIGHT: 66 IN | HEART RATE: 89 BPM | SYSTOLIC BLOOD PRESSURE: 139 MMHG | BODY MASS INDEX: 35.97 KG/M2 | DIASTOLIC BLOOD PRESSURE: 101 MMHG

## 2025-04-11 DIAGNOSIS — K76.0 FATTY LIVER: ICD-10-CM

## 2025-04-11 DIAGNOSIS — K58.0 IRRITABLE BOWEL SYNDROME WITH DIARRHEA: Primary | ICD-10-CM

## 2025-04-11 PROCEDURE — 3075F SYST BP GE 130 - 139MM HG: CPT | Mod: CPTII,,,

## 2025-04-11 PROCEDURE — 99214 OFFICE O/P EST MOD 30 MIN: CPT | Mod: S$PBB,,,

## 2025-04-11 PROCEDURE — 99214 OFFICE O/P EST MOD 30 MIN: CPT | Mod: PBBFAC

## 2025-04-11 PROCEDURE — 99999 PR PBB SHADOW E&M-EST. PATIENT-LVL IV: CPT | Mod: PBBFAC,,,

## 2025-04-11 PROCEDURE — 3008F BODY MASS INDEX DOCD: CPT | Mod: CPTII,,,

## 2025-04-11 PROCEDURE — 1159F MED LIST DOCD IN RCRD: CPT | Mod: CPTII,,,

## 2025-04-11 PROCEDURE — 3080F DIAST BP >= 90 MM HG: CPT | Mod: CPTII,,,

## 2025-04-11 PROCEDURE — 1160F RVW MEDS BY RX/DR IN RCRD: CPT | Mod: CPTII,,,

## 2025-04-11 RX ORDER — FAMOTIDINE 20 MG/1
20 TABLET, FILM COATED ORAL
COMMUNITY
Start: 2025-01-09

## 2025-04-11 RX ORDER — COLESTIPOL HYDROCHLORIDE 1 G/1
1 TABLET ORAL 2 TIMES DAILY
Qty: 180 TABLET | Refills: 3 | Status: SHIPPED | OUTPATIENT
Start: 2025-04-11 | End: 2026-04-11

## 2025-04-29 NOTE — PROGRESS NOTES
Gastroenterology Clinic Note    Patient ID: [id number]   Referring MD: No ref. provider found   Chief Complaint:   Chief Complaint   Patient presents with    Follow-up       History of Present Illness   Stephanie Mcarthur is an 49 y.o. WF who is referred for abdominal pain. She reports epigastric and LUQ abdominal pain. She will occasionally have lower abdominal pain as well. She complains of intermittent nausea and will alternate diarrhea and formed stool. She denies any hematochezia or melena. She does experienced intermittent dysphagia with certain solid foods such as bread. She was started on Prilosec 40 mg daily within the past week. She denies any prior endoscopy. No FMH of CRC.    Previous workup:  EGD; abdominal ultrasound    Last colonoscopy was 2024 with 10 year recall for screening purposes.    Interval  - abdominal ultrasound revealed a 7 mm gallstone suggest fatty liver; s/p cholecystectomy; abdominal pain has improved  - EGD with dilation improved dysphagia  - she continues to experience 3-4 episodes of diarrhea per day; urgency with meals; occasional abdominal discomfort; no hematochezia or melena; symptoms did not improve with daily fiber supplement and low FODMAP diet     Review of Systems   Constitutional:  Negative for weight loss.   Gastrointestinal:  Positive for diarrhea. Negative for abdominal pain, blood in stool, constipation, melena, nausea and vomiting.       Past Medical History      Past Medical History:   Diagnosis Date    Bell's palsy     Hx of bell's palsy. pt states during pregnancy     Hormone replacement therapy        Past Surgical History     Past Surgical History:   Procedure Laterality Date     SECTION      x4    EYE SURGERY      GALLBLADDER SURGERY  06/10/2024    KNEE SURGERY      left       Allergies   Review of patient's allergies indicates:  No Known Allergies    Immunization History     There is no immunization history on file for this patient.    Past Family  "History      Family History   Problem Relation Name Age of Onset    Hypertension Mother      Heart disease Father      Breast cancer Paternal Aunt         Past Social History      Social History     Socioeconomic History    Marital status:    Tobacco Use    Smoking status: Never     Passive exposure: Never    Smokeless tobacco: Never   Substance and Sexual Activity    Alcohol use: Never    Drug use: Never    Sexual activity: Not Currently       Current Medications     Outpatient Medications Marked as Taking for the 4/11/25 encounter (Office Visit) with Shauna Fallon FNP   Medication Sig Dispense Refill    calcium-vitamin D 250 mg-2.5 mcg (100 unit) per tablet Take 1 tablet by mouth 2 (two) times daily.      cyanocobalamin (VITAMIN B-12) 1000 MCG tablet Take 100 mcg by mouth once daily.      famotidine (PEPCID) 20 MG tablet Take 20 mg by mouth.      ferrous sulfate 325 (65 FE) MG EC tablet Take 1 tablet (325 mg total) by mouth once daily. 30 tablet 3        I have reviewed the current medications, allergies, vital signs, past medical and surgical history, family medical history, and social history for this encounter and agree with all findings.    OBJECTIVE    Physical Exam    BP (!) 139/101   Pulse 89   Ht 5' 6" (1.676 m)   Wt 101.5 kg (223 lb 12.8 oz)   SpO2 95%   BMI 36.12 kg/m²   GEN: Well appearing, cooperative, NAD  NECK: Supple, no LAD  CV: Normal rate  RESP: Unlabored  ABD: ND, no guarding  EXT: No clubbing, cyanosis, or edema  SKIN: Warm and dry  NEURO: AAO x4.     LABS    CBC (with or without Differential):   Lab Results   Component Value Date    WBC 7.41 05/21/2024    HGB 11.6 (L) 05/21/2024    HCT 39.7 05/21/2024    MCV 71.0 (L) 05/21/2024    MCH 20.8 (L) 05/21/2024    MCHC 29.2 (L) 05/21/2024    RDW 19.2 (H) 05/21/2024     05/21/2024    MPV 9.6 05/21/2024    NEUTOPHILPCT 50.1 (L) 05/21/2024    DIFFTYPE Scan Smear 05/21/2024     BMP/CMP:   Lab Results   Component Value Date     " 05/21/2024    K 4.2 05/21/2024     (H) 05/21/2024    CO2 28 05/21/2024    BUN 11 05/21/2024    CREATININE 0.76 05/21/2024     05/21/2024    CALCIUM 8.9 05/21/2024    ALBUMIN 3.5 05/21/2024    AST 26 05/21/2024    ALT 28 05/21/2024    ALKPHOS 108 (H) 05/21/2024        IMAGING  Ultrasound abdomen complete 05/2024  - Single 7 mm gallstone visualized. No abnormal gallbladder wall thickening or distention. Increased echogenicity of the liver suggestive of steatosis.     Xray KUB  - Nonspecific bowel gas pattern     ASSESSMENT  Stephanie Mcarthur is a 49 y.o. WF without significant medical history who is referred for follow-up.     1. Irritable bowel syndrome with diarrhea    2. Fatty liver           PLAN    - Colestid for chronic diarrhea that failed to respond to daily fiber  - US liver and elastography for surveillance of fatty liver    There are no Patient Instructions on file for this visit.      Orders Placed This Encounter   Procedures    US Abdomen Limited_Liver     Standing Status:   Future     Expected Date:   4/11/2025     Expiration Date:   4/11/2026     May the Radiologist modify the order per protocol to meet the clinical needs of the patient?:   Yes     Release to patient:   Immediate    US Elastography Liver w/imaging     Standing Status:   Future     Expected Date:   4/11/2025     Expiration Date:   4/11/2026     May the Radiologist modify the order per protocol to meet the clinical needs of the patient?:   Yes     Release to patient:   Immediate    CBC Auto Differential     Standing Status:   Future     Expected Date:   4/11/2025     Expiration Date:   6/10/2026    Comprehensive Metabolic Panel     Standing Status:   Future     Expected Date:   4/11/2025     Expiration Date:   6/10/2026    Iron and TIBC     Standing Status:   Future     Expected Date:   4/11/2025     Expiration Date:   6/11/2026    Ferritin     Standing Status:   Future     Expected Date:   4/11/2025     Expiration Date:    6/11/2026         The risks and benefits of my recommendations, as well as other treatment options were discussed with the patient today. All questions were answered.    35 minutes of total time spent on the encounter, which includes face to face time and non-face to face time preparing to see the patient (eg, review of tests), obtaining and/or reviewing separately obtained history, documenting clinical information in the electronic or other health record, Independently interpreting results (not separately reported) and communicating results to the patient/family/caregiver, or care coordination (not separately reported).        Shauna Fallon, FNP/ACNP  [facility]

## 2025-04-30 ENCOUNTER — PATIENT MESSAGE (OUTPATIENT)
Dept: GASTROENTEROLOGY | Facility: CLINIC | Age: 50
End: 2025-04-30
Payer: COMMERCIAL

## 2025-04-30 ENCOUNTER — HOSPITAL ENCOUNTER (OUTPATIENT)
Dept: RADIOLOGY | Facility: HOSPITAL | Age: 50
Discharge: HOME | End: 2025-04-30
Payer: COMMERCIAL

## 2025-04-30 DIAGNOSIS — K76.0 FATTY LIVER: ICD-10-CM

## 2025-04-30 PROCEDURE — 76705 ECHO EXAM OF ABDOMEN: CPT | Mod: TC

## 2025-04-30 PROCEDURE — 76981 USE PARENCHYMA: CPT | Mod: TC

## 2025-04-30 PROCEDURE — 76705 ECHO EXAM OF ABDOMEN: CPT | Mod: 26,,, | Performed by: INTERNAL MEDICINE

## 2025-05-02 ENCOUNTER — RESULTS FOLLOW-UP (OUTPATIENT)
Dept: GASTROENTEROLOGY | Facility: CLINIC | Age: 50
End: 2025-05-02

## 2025-05-05 ENCOUNTER — TELEPHONE (OUTPATIENT)
Dept: GASTROENTEROLOGY | Facility: CLINIC | Age: 50
End: 2025-05-05
Payer: COMMERCIAL

## 2025-05-05 NOTE — TELEPHONE ENCOUNTER
----- Message from YVETTE Moraes sent at 5/2/2025  1:36 PM CDT -----  Her ultrasound just shows mild hepatomegaly and hepatic steatosis.  No changes with our current plan of care.  ----- Message -----  From: Interface, Rad Results In  Sent: 5/2/2025   9:51 AM CDT  To: YVETTE Moraes

## 2025-05-15 DIAGNOSIS — R19.7 DIARRHEA, UNSPECIFIED TYPE: Primary | ICD-10-CM

## 2025-05-15 RX ORDER — CHOLESTYRAMINE 4 G/9G
1 POWDER, FOR SUSPENSION ORAL 2 TIMES DAILY
Qty: 60 PACKET | Refills: 3 | Status: SHIPPED | OUTPATIENT
Start: 2025-05-15 | End: 2026-05-15

## 2025-05-16 ENCOUNTER — TELEPHONE (OUTPATIENT)
Dept: GASTROENTEROLOGY | Facility: CLINIC | Age: 50
End: 2025-05-16
Payer: COMMERCIAL

## 2025-05-16 NOTE — TELEPHONE ENCOUNTER
----- Message from YVETTE Moraes sent at 5/15/2025  5:22 PM CDT -----  I sent prescription for cholestyramine to her pharmacy to see if this is better covered than the colestid was

## 2025-07-29 ENCOUNTER — HOSPITAL ENCOUNTER (OUTPATIENT)
Dept: RADIOLOGY | Facility: HOSPITAL | Age: 50
Discharge: HOME | End: 2025-07-29
Attending: NURSE PRACTITIONER
Payer: COMMERCIAL

## 2025-07-29 DIAGNOSIS — Z12.31 OTHER SCREENING MAMMOGRAM: ICD-10-CM

## 2025-07-29 PROCEDURE — 77067 SCR MAMMO BI INCL CAD: CPT | Mod: TC

## 2025-07-29 PROCEDURE — 77063 BREAST TOMOSYNTHESIS BI: CPT | Mod: 26,,, | Performed by: RADIOLOGY

## 2025-07-29 PROCEDURE — 77067 SCR MAMMO BI INCL CAD: CPT | Mod: 26,,, | Performed by: RADIOLOGY
